# Patient Record
Sex: FEMALE | Employment: FULL TIME | URBAN - METROPOLITAN AREA
[De-identification: names, ages, dates, MRNs, and addresses within clinical notes are randomized per-mention and may not be internally consistent; named-entity substitution may affect disease eponyms.]

---

## 2021-05-24 ENCOUNTER — RECORDS - HEALTHEAST (OUTPATIENT)
Dept: ADMINISTRATIVE | Facility: CLINIC | Age: 71
End: 2021-05-24

## 2022-12-30 ENCOUNTER — APPOINTMENT (OUTPATIENT)
Dept: CT IMAGING | Facility: CLINIC | Age: 72
End: 2022-12-30
Attending: EMERGENCY MEDICINE
Payer: COMMERCIAL

## 2022-12-30 ENCOUNTER — HOSPITAL ENCOUNTER (EMERGENCY)
Facility: CLINIC | Age: 72
Discharge: HOME | End: 2022-12-30
Attending: EMERGENCY MEDICINE | Admitting: EMERGENCY MEDICINE
Payer: COMMERCIAL

## 2022-12-30 VITALS
OXYGEN SATURATION: 98 % | SYSTOLIC BLOOD PRESSURE: 176 MMHG | DIASTOLIC BLOOD PRESSURE: 92 MMHG | TEMPERATURE: 100 F | RESPIRATION RATE: 18 BRPM | WEIGHT: 180 LBS | HEART RATE: 70 BPM

## 2022-12-30 DIAGNOSIS — Z87.442 HISTORY OF KIDNEY STONES: ICD-10-CM

## 2022-12-30 DIAGNOSIS — K57.32 DIVERTICULITIS OF COLON: ICD-10-CM

## 2022-12-30 DIAGNOSIS — N28.89 RENAL MASS: ICD-10-CM

## 2022-12-30 DIAGNOSIS — R10.32 ABDOMINAL PAIN, LEFT LOWER QUADRANT: ICD-10-CM

## 2022-12-30 DIAGNOSIS — R11.0 NAUSEA WITHOUT VOMITING: ICD-10-CM

## 2022-12-30 LAB
ALBUMIN SERPL BCG-MCNC: 4.4 G/DL (ref 3.5–5.2)
ALBUMIN UR-MCNC: 30 MG/DL
ALP SERPL-CCNC: 79 U/L (ref 35–104)
ALT SERPL W P-5'-P-CCNC: 19 U/L (ref 10–35)
ANION GAP SERPL CALCULATED.3IONS-SCNC: 11 MMOL/L (ref 7–15)
APPEARANCE UR: CLEAR
AST SERPL W P-5'-P-CCNC: 22 U/L (ref 10–35)
BASOPHILS # BLD AUTO: 0 10E3/UL (ref 0–0.2)
BASOPHILS NFR BLD AUTO: 0 %
BILIRUB SERPL-MCNC: 0.4 MG/DL
BILIRUB UR QL STRIP: NEGATIVE
BUN SERPL-MCNC: 39.5 MG/DL (ref 8–23)
CALCIUM SERPL-MCNC: 11.4 MG/DL (ref 8.8–10.2)
CHLORIDE SERPL-SCNC: 102 MMOL/L (ref 98–107)
COLOR UR AUTO: ABNORMAL
CREAT SERPL-MCNC: 2.19 MG/DL (ref 0.51–0.95)
DEPRECATED HCO3 PLAS-SCNC: 24 MMOL/L (ref 22–29)
EOSINOPHIL # BLD AUTO: 0.1 10E3/UL (ref 0–0.7)
EOSINOPHIL NFR BLD AUTO: 1 %
ERYTHROCYTE [DISTWIDTH] IN BLOOD BY AUTOMATED COUNT: 12.6 % (ref 10–15)
GFR SERPL CREATININE-BSD FRML MDRD: 24 ML/MIN/1.73M2
GLUCOSE SERPL-MCNC: 109 MG/DL (ref 70–99)
GLUCOSE UR STRIP-MCNC: NEGATIVE MG/DL
HCT VFR BLD AUTO: 41.6 % (ref 35–47)
HGB BLD-MCNC: 13.4 G/DL (ref 11.7–15.7)
HGB UR QL STRIP: NEGATIVE
IMM GRANULOCYTES # BLD: 0 10E3/UL
IMM GRANULOCYTES NFR BLD: 0 %
KETONES UR STRIP-MCNC: NEGATIVE MG/DL
LEUKOCYTE ESTERASE UR QL STRIP: ABNORMAL
LYMPHOCYTES # BLD AUTO: 1.5 10E3/UL (ref 0.8–5.3)
LYMPHOCYTES NFR BLD AUTO: 16 %
MCH RBC QN AUTO: 30.7 PG (ref 26.5–33)
MCHC RBC AUTO-ENTMCNC: 32.2 G/DL (ref 31.5–36.5)
MCV RBC AUTO: 95 FL (ref 78–100)
MONOCYTES # BLD AUTO: 0.9 10E3/UL (ref 0–1.3)
MONOCYTES NFR BLD AUTO: 9 %
MUCOUS THREADS #/AREA URNS LPF: PRESENT /LPF
NEUTROPHILS # BLD AUTO: 7 10E3/UL (ref 1.6–8.3)
NEUTROPHILS NFR BLD AUTO: 74 %
NITRATE UR QL: NEGATIVE
NRBC # BLD AUTO: 0 10E3/UL
NRBC BLD AUTO-RTO: 0 /100
PH UR STRIP: 6 [PH] (ref 5–7)
PLATELET # BLD AUTO: 203 10E3/UL (ref 150–450)
POTASSIUM SERPL-SCNC: 4.5 MMOL/L (ref 3.4–5.3)
PROT SERPL-MCNC: 7.8 G/DL (ref 6.4–8.3)
RBC # BLD AUTO: 4.37 10E6/UL (ref 3.8–5.2)
RBC URINE: 0 /HPF
SODIUM SERPL-SCNC: 137 MMOL/L (ref 136–145)
SP GR UR STRIP: 1.02 (ref 1–1.03)
SQUAMOUS EPITHELIAL: 1 /HPF
UROBILINOGEN UR STRIP-MCNC: NORMAL MG/DL
WBC # BLD AUTO: 9.5 10E3/UL (ref 4–11)
WBC URINE: 2 /HPF

## 2022-12-30 PROCEDURE — 250N000009 HC RX 250: Performed by: EMERGENCY MEDICINE

## 2022-12-30 PROCEDURE — 99285 EMERGENCY DEPT VISIT HI MDM: CPT | Performed by: EMERGENCY MEDICINE

## 2022-12-30 PROCEDURE — 250N000013 HC RX MED GY IP 250 OP 250 PS 637: Performed by: EMERGENCY MEDICINE

## 2022-12-30 PROCEDURE — 80053 COMPREHEN METABOLIC PANEL: CPT | Performed by: EMERGENCY MEDICINE

## 2022-12-30 PROCEDURE — 74177 CT ABD & PELVIS W/CONTRAST: CPT

## 2022-12-30 PROCEDURE — 36415 COLL VENOUS BLD VENIPUNCTURE: CPT | Performed by: EMERGENCY MEDICINE

## 2022-12-30 PROCEDURE — 250N000011 HC RX IP 250 OP 636: Performed by: EMERGENCY MEDICINE

## 2022-12-30 PROCEDURE — 85025 COMPLETE CBC W/AUTO DIFF WBC: CPT | Performed by: EMERGENCY MEDICINE

## 2022-12-30 PROCEDURE — 99285 EMERGENCY DEPT VISIT HI MDM: CPT | Mod: 25 | Performed by: EMERGENCY MEDICINE

## 2022-12-30 PROCEDURE — 81001 URINALYSIS AUTO W/SCOPE: CPT | Performed by: EMERGENCY MEDICINE

## 2022-12-30 RX ORDER — IOPAMIDOL 755 MG/ML
81 INJECTION, SOLUTION INTRAVASCULAR ONCE
Status: COMPLETED | OUTPATIENT
Start: 2022-12-30 | End: 2022-12-30

## 2022-12-30 RX ORDER — OXYCODONE AND ACETAMINOPHEN 5; 325 MG/1; MG/1
1 TABLET ORAL ONCE
Status: COMPLETED | OUTPATIENT
Start: 2022-12-30 | End: 2022-12-30

## 2022-12-30 RX ORDER — HYDROMORPHONE HYDROCHLORIDE 1 MG/ML
0.5 INJECTION, SOLUTION INTRAMUSCULAR; INTRAVENOUS; SUBCUTANEOUS EVERY 30 MIN PRN
Status: DISCONTINUED | OUTPATIENT
Start: 2022-12-30 | End: 2022-12-30 | Stop reason: HOSPADM

## 2022-12-30 RX ORDER — OXYCODONE AND ACETAMINOPHEN 5; 325 MG/1; MG/1
1 TABLET ORAL EVERY 6 HOURS PRN
Qty: 12 TABLET | Refills: 0 | Status: SHIPPED | OUTPATIENT
Start: 2022-12-30 | End: 2023-01-02

## 2022-12-30 RX ORDER — OXYCODONE AND ACETAMINOPHEN 5; 325 MG/1; MG/1
1 TABLET ORAL EVERY 6 HOURS PRN
Qty: 12 TABLET | Refills: 0 | Status: SHIPPED | OUTPATIENT
Start: 2022-12-30 | End: 2022-12-30

## 2022-12-30 RX ORDER — ONDANSETRON 2 MG/ML
4 INJECTION INTRAMUSCULAR; INTRAVENOUS
Status: DISCONTINUED | OUTPATIENT
Start: 2022-12-30 | End: 2022-12-30 | Stop reason: HOSPADM

## 2022-12-30 RX ADMIN — AMOXICILLIN AND CLAVULANATE POTASSIUM 1 TABLET: 875; 125 TABLET, FILM COATED ORAL at 20:56

## 2022-12-30 RX ADMIN — SODIUM CHLORIDE 62 ML: 9 INJECTION, SOLUTION INTRAVENOUS at 18:58

## 2022-12-30 RX ADMIN — OXYCODONE HYDROCHLORIDE AND ACETAMINOPHEN 1 TABLET: 5; 325 TABLET ORAL at 20:56

## 2022-12-30 RX ADMIN — IOPAMIDOL 81 ML: 755 INJECTION, SOLUTION INTRAVENOUS at 18:56

## 2022-12-30 ASSESSMENT — ENCOUNTER SYMPTOMS
NAUSEA: 1
MUSCULOSKELETAL NEGATIVE: 1
RESPIRATORY NEGATIVE: 1
EYES NEGATIVE: 1
ALLERGIC/IMMUNOLOGIC NEGATIVE: 1
PSYCHIATRIC NEGATIVE: 1
NEUROLOGICAL NEGATIVE: 1
ABDOMINAL PAIN: 1
CONSTITUTIONAL NEGATIVE: 1
HEMATOLOGIC/LYMPHATIC NEGATIVE: 1
CARDIOVASCULAR NEGATIVE: 1
ENDOCRINE NEGATIVE: 1

## 2022-12-30 ASSESSMENT — ACTIVITIES OF DAILY LIVING (ADL)
ADLS_ACUITY_SCORE: 35
ADLS_ACUITY_SCORE: 35

## 2022-12-30 NOTE — ED PROVIDER NOTES
History     Chief Complaint   Patient presents with     Abdominal Pain     HPI  Falguni Bennett is a 72 year old female who who presents with lower abdominal pain since yesterday.      On examination patient arrived by car with her  for further evaluation with lower abdominal pain.  Patient reports a history of kidney stones improved after parathyroidectomy.  History of colonoscopy last in the spring 2022 showing polyps and diverticulosis.  She reports she struggles constipation but no prior diagnosis of IBS with constipation.  She reports that she last had a bowel movement about 3 days ago.  She reports no melena or hematochezia no fever or chills and no flank pain or back pain.  She reports nausea but no vomiting.  Due to persistent pain and abdominal pain with nausea she presents for further care.     Allergies:  Allergies   Allergen Reactions     Morphine [Morphine Sulfate-Nacl] Nausea and Vomiting     Cipro [Ciprofloxacin Hydrochloride]      Muscle weakness     Nuts      (Filberts) Throat swells       Problem List:    There are no problems to display for this patient.       Past Medical History:    No past medical history on file.    Past Surgical History:    Past Surgical History:   Procedure Laterality Date     EXPLORE NECK  2011    Procedure:EXPLORE NECK; Surgeon:LUIS ADAM; Location: OR     EXTRACORPOREAL SHOCK WAVE LITHOTRIPSY (ESWL)       PARATHYROIDECTOMY  2011    Procedure:PARATHYROIDECTOMY; Left inferior Parathyroidectomy; Surgeon:LUIS ADAM; Location: OR       Family History:    No family history on file.    Social History:  Marital Status:   [2]  Social History     Tobacco Use     Smoking status: Former     Packs/day: 0.50     Types: Cigarettes     Quit date: 2011     Years since quittin.7   Substance Use Topics     Alcohol use: No     Drug use: No        Medications:    senna-docusate (SENOKOT-S;PERICOLACE) 8.6-50 MG per  tablet          Review of Systems   Constitutional: Negative.    HENT: Negative.    Eyes: Negative.    Respiratory: Negative.    Cardiovascular: Negative.    Gastrointestinal: Positive for abdominal pain and nausea.   Endocrine: Negative.    Genitourinary: Negative.    Musculoskeletal: Negative.    Skin: Negative.    Allergic/Immunologic: Negative.    Neurological: Negative.    Hematological: Negative.    Psychiatric/Behavioral: Negative.    All other systems reviewed and are negative.      Physical Exam   BP: (!) 176/92  Pulse: 70  Temp: 100  F (37.8  C)  Resp: 18  Weight: 81.6 kg (180 lb)  SpO2: 98 %      Physical Exam  Constitutional:       General: She is not in acute distress.     Appearance: She is well-developed. She is not ill-appearing, toxic-appearing or diaphoretic.   HENT:      Head: Normocephalic and atraumatic.      Mouth/Throat:      Mouth: Mucous membranes are moist.   Eyes:      Extraocular Movements: Extraocular movements intact.      Pupils: Pupils are equal, round, and reactive to light.   Cardiovascular:      Rate and Rhythm: Normal rate and regular rhythm.   Pulmonary:      Effort: Pulmonary effort is normal. No respiratory distress.      Breath sounds: Normal breath sounds. No stridor. No wheezing, rhonchi or rales.   Chest:      Chest wall: No tenderness.   Abdominal:      General: Abdomen is flat. Bowel sounds are normal.      Palpations: Abdomen is soft.       Skin:     Capillary Refill: Capillary refill takes less than 2 seconds.      Coloration: Skin is not cyanotic, jaundiced, mottled or pale.      Findings: No erythema or rash.   Neurological:      General: No focal deficit present.      Mental Status: She is alert and oriented to person, place, and time.         ED Course                 Procedures              Critical Care time:  none                ED medications:  Medications   0.9% sodium chloride BOLUS (has no administration in time range)   ondansetron (ZOFRAN) injection 4 mg  (has no administration in time range)   HYDROmorphone (PF) (DILAUDID) injection 0.5 mg (has no administration in time range)   iopamidol (ISOVUE-370) solution 81 mL (81 mLs Intravenous Given 12/30/22 1856)   sodium chloride 0.9 % bag 500mL for CT scan flush use (62 mLs Intravenous Given 12/30/22 1858)       ED Vitals:  Vitals:    12/30/22 1514 12/30/22 1515   BP: (!) 176/92    Pulse: 70    Resp: 18    Temp: 100  F (37.8  C)    SpO2: 98%    Weight:  81.6 kg (180 lb)     ED labs and imaging:  Results for orders placed or performed during the hospital encounter of 12/30/22   Comprehensive metabolic panel     Status: Abnormal   Result Value Ref Range    Sodium 137 136 - 145 mmol/L    Potassium 4.5 3.4 - 5.3 mmol/L    Chloride 102 98 - 107 mmol/L    Carbon Dioxide (CO2) 24 22 - 29 mmol/L    Anion Gap 11 7 - 15 mmol/L    Urea Nitrogen 39.5 (H) 8.0 - 23.0 mg/dL    Creatinine 2.19 (H) 0.51 - 0.95 mg/dL    Calcium 11.4 (H) 8.8 - 10.2 mg/dL    Glucose 109 (H) 70 - 99 mg/dL    Alkaline Phosphatase 79 35 - 104 U/L    AST 22 10 - 35 U/L    ALT 19 10 - 35 U/L    Protein Total 7.8 6.4 - 8.3 g/dL    Albumin 4.4 3.5 - 5.2 g/dL    Bilirubin Total 0.4 <=1.2 mg/dL    GFR Estimate 24 (L) >60 mL/min/1.73m2   CBC with platelets and differential     Status: None   Result Value Ref Range    WBC Count 9.5 4.0 - 11.0 10e3/uL    RBC Count 4.37 3.80 - 5.20 10e6/uL    Hemoglobin 13.4 11.7 - 15.7 g/dL    Hematocrit 41.6 35.0 - 47.0 %    MCV 95 78 - 100 fL    MCH 30.7 26.5 - 33.0 pg    MCHC 32.2 31.5 - 36.5 g/dL    RDW 12.6 10.0 - 15.0 %    Platelet Count 203 150 - 450 10e3/uL    % Neutrophils 74 %    % Lymphocytes 16 %    % Monocytes 9 %    % Eosinophils 1 %    % Basophils 0 %    % Immature Granulocytes 0 %    NRBCs per 100 WBC 0 <1 /100    Absolute Neutrophils 7.0 1.6 - 8.3 10e3/uL    Absolute Lymphocytes 1.5 0.8 - 5.3 10e3/uL    Absolute Monocytes 0.9 0.0 - 1.3 10e3/uL    Absolute Eosinophils 0.1 0.0 - 0.7 10e3/uL    Absolute Basophils 0.0 0.0  - 0.2 10e3/uL    Absolute Immature Granulocytes 0.0 <=0.4 10e3/uL    Absolute NRBCs 0.0 10e3/uL   CBC with platelets differential     Status: None    Narrative    The following orders were created for panel order CBC with platelets differential.  Procedure                               Abnormality         Status                     ---------                               -----------         ------                     CBC with platelets and d...[[id number]]                      Final result                 Please view results for these tests on the individual orders.       Assessments & Plan (with Medical Decision Making)   Assessment Summary and Clinical Impression: 72-year-old female who presented with lower abdominal pain over the last 24 hours.  She arrived with a temp of 37.8.  Blood pressure was 176/92.  History of kidney stones.  No prior abdominal surgery.  History of colonoscopy with polyps and diverticulosis.  On exam she appeared in no acute distress.  She was nontoxic.  Protuberant abdomen which was soft with quiet bowel sounds throughout.  Pain was along the belt line.  With history of colonoscopy showing polyps and diveticulosis imaging obtained  to evaluate for intra-abdominal process and/or catastrophe. At shift end final disposition depend on ED course and imaging.    ED course and Plan:  Reviewed the medical record.  Last abdominal imaging was 10/7/2010.  Given her age and location of pain and discomfort work-up was undertaken to evaluate for acute diverticulitis or other intra-abdominal process and/or catastrophe.  Patient's work-up revealed normal white count, normal hemoglobin normal liver enzymes.  Glucose was 109.  She was offered therapies to manage her pain and nausea as reported    At shift end at 7.15pm patient signed out to Dr LUCRETIA Aldrich awaiting urinalysis and  CT abdomen pelvis with contrast.  Anticipate discharge if CT shows no acute or emergent intra-abdominal process or findings.  If  there are acute findings proceed as medically required.      Disclaimer: This note consists of symbols derived from keyboarding, dictation and/or voice recognition software. As a result, there may be errors in the script that have gone undetected. Please consider this when interpreting information found in this chart.  I have reviewed the nursing notes.    I have reviewed the findings, diagnosis, plan and need for follow up with the patient.             New Prescriptions    No medications on file       Final diagnoses:   Abdominal pain, left lower quadrant - across the belt line over the last 24 hours   Nausea without vomiting - over the last 24 hours   History of kidney stones       12/30/2022   [facility] EMERGENCY DEPT     Alfredo Wyatt MD  12/30/22 1923

## 2022-12-30 NOTE — ED TRIAGE NOTES
Low abd pain since yesterday     Triage Assessment     Row Name 12/30/22 0852       Triage Assessment (Adult)    Airway WDL WDL       Respiratory WDL    Respiratory WDL WDL       Skin Circulation/Temperature WDL    Skin Circulation/Temperature WDL WDL       Cardiac WDL    Cardiac WDL WDL       Peripheral/Neurovascular WDL    Peripheral Neurovascular WDL WDL       Cognitive/Neuro/Behavioral WDL    Cognitive/Neuro/Behavioral WDL WDL

## 2022-12-31 NOTE — ED PROVIDER NOTES
Emergency Department Patient Sign-out       Brief HPI:  This is a 72 year old female signed out to me by Dr. Wyatt.  See initial ED Provider note for details of the presentation.       Significant Events prior to my assuming care:    -suspicion for diverticulitis  -pending CT       Exam:   Patient Vitals for the past 24 hrs:   BP Temp Pulse Resp SpO2 Weight   12/30/22 1515 -- -- -- -- -- 81.6 kg (180 lb)   12/30/22 1514 (!) 176/92 100  F (37.8  C) 70 18 98 % --           ED RESULTS:   Results for orders placed or performed during the hospital encounter of 12/30/22 (from the past 24 hour(s))   CBC with platelets differential     Status: None    Collection Time: 12/30/22  5:54 PM    Narrative    The following orders were created for panel order CBC with platelets differential.  Procedure                               Abnormality         Status                     ---------                               -----------         ------                     CBC with platelets and d...[[id number]]                      Final result                 Please view results for these tests on the individual orders.   Comprehensive metabolic panel     Status: Abnormal    Collection Time: 12/30/22  5:54 PM   Result Value Ref Range    Sodium 137 136 - 145 mmol/L    Potassium 4.5 3.4 - 5.3 mmol/L    Chloride 102 98 - 107 mmol/L    Carbon Dioxide (CO2) 24 22 - 29 mmol/L    Anion Gap 11 7 - 15 mmol/L    Urea Nitrogen 39.5 (H) 8.0 - 23.0 mg/dL    Creatinine 2.19 (H) 0.51 - 0.95 mg/dL    Calcium 11.4 (H) 8.8 - 10.2 mg/dL    Glucose 109 (H) 70 - 99 mg/dL    Alkaline Phosphatase 79 35 - 104 U/L    AST 22 10 - 35 U/L    ALT 19 10 - 35 U/L    Protein Total 7.8 6.4 - 8.3 g/dL    Albumin 4.4 3.5 - 5.2 g/dL    Bilirubin Total 0.4 <=1.2 mg/dL    GFR Estimate 24 (L) >60 mL/min/1.73m2   CBC with platelets and differential     Status: None    Collection Time: 12/30/22  5:54 PM   Result Value Ref Range    WBC Count 9.5 4.0 - 11.0 10e3/uL    RBC  Count 4.37 3.80 - 5.20 10e6/uL    Hemoglobin 13.4 11.7 - 15.7 g/dL    Hematocrit 41.6 35.0 - 47.0 %    MCV 95 78 - 100 fL    MCH 30.7 26.5 - 33.0 pg    MCHC 32.2 31.5 - 36.5 g/dL    RDW 12.6 10.0 - 15.0 %    Platelet Count 203 150 - 450 10e3/uL    % Neutrophils 74 %    % Lymphocytes 16 %    % Monocytes 9 %    % Eosinophils 1 %    % Basophils 0 %    % Immature Granulocytes 0 %    NRBCs per 100 WBC 0 <1 /100    Absolute Neutrophils 7.0 1.6 - 8.3 10e3/uL    Absolute Lymphocytes 1.5 0.8 - 5.3 10e3/uL    Absolute Monocytes 0.9 0.0 - 1.3 10e3/uL    Absolute Eosinophils 0.1 0.0 - 0.7 10e3/uL    Absolute Basophils 0.0 0.0 - 0.2 10e3/uL    Absolute Immature Granulocytes 0.0 <=0.4 10e3/uL    Absolute NRBCs 0.0 10e3/uL       ED MEDICATIONS:   Medications   0.9% sodium chloride BOLUS (has no administration in time range)   iopamidol (ISOVUE-370) solution 81 mL (81 mLs Intravenous Given 12/30/22 1856)   sodium chloride 0.9 % bag 500mL for CT scan flush use (62 mLs Intravenous Given 12/30/22 1858)         Impression:  No diagnosis found.    Plan:    -Follow up CT  -reassess patient     851 - updated patient with results as below:                                                                   IMPRESSION:   1.  Acute, uncomplicated sigmoid diverticulitis.   2.  Incidental solid appearing 4.9 cm right lower pole renal mass, highly suspicious for neoplasm. There are some additional smaller indeterminate renal lesions. Recommend further characterization with contrast-enhanced MRI on a nonemergent basis and   referral to urology.    I gave her dose of Augmentin here and prescribed Augmentin to start tomorrow. Gave pain medication for home and cautioned about addiction, driving, and alcohol.     I informed the patient about the renal mass and emphasized the importance of follow up. I put it in her discharge paperwork. I also put an urgent referral to urology and ordered an outpatient renal MRI with and without contrast.     Patient  had all questions answered and felt safe with discharge.       MD Valdemar Rey, Peter Monteiro MD  12/30/22 1905       Peter Aldrich MD  12/30/22 2057

## 2022-12-31 NOTE — DISCHARGE INSTRUCTIONS
You have diverticulitis. We treated that with antibiotics. I gave you the first dose here. You can  your prescription tomorrow.    I prescribed some pain medication as well. It is an opiate and do not mix it with alcohol, driving, Tylenol, other sedating medications, drugs, or machinery operation.     You have a 4.9 cm right lower pole renal mass. This is concerning for a possible cancer. You need to see a urologist and get a contrast-enhanced MRI. You regular doctor can coordinate that.     It is very important to do this.

## 2023-01-09 ENCOUNTER — TELEPHONE (OUTPATIENT)
Dept: UROLOGY | Facility: CLINIC | Age: 73
End: 2023-01-09

## 2023-01-09 NOTE — TELEPHONE ENCOUNTER
Called and left VM for patient regarding upcoming VV with Dr. Mijares.  This author's direct line provided for future questions/concerns.    Minor Johnson, RN  RN Care Coordinator - Urology

## 2023-01-10 ENCOUNTER — TELEPHONE (OUTPATIENT)
Dept: UROLOGY | Facility: CLINIC | Age: 73
End: 2023-01-10

## 2023-01-10 NOTE — TELEPHONE ENCOUNTER
Called and left VM for patient again regarding upcoming appointment with Dr. Mijares.  This author's direct line provided for future questions/concerns.    Minor Johnson, RN  RN Care Coordinator - Urology

## 2023-01-10 NOTE — TELEPHONE ENCOUNTER
MEDICAL RECORDS REQUEST   [facility]  Urology Clinic  [address] [place] [address]  PHONE: [phone]  Fax: [phone]        FUTURE VISIT INFORMATION                                                   Falguni Bennett YOB: 1950 scheduled for future visit at [facility]    APPOINTMENT INFORMATION:    Date: 2023    Provider:  Urbano Mijares MD    Reason for Visit/Diagnosis: new renal mass    REFERRAL INFORMATION:    Referring provider:  Peter Aldrich MD      RECORDS REQUESTED FOR VISIT                                                     NOTES  STATUS/DETAILS   DISCHARGE REPORT from the ER  yes, 2022 -- [facility] ED   MEDICATION LIST  yes   LABS     URINALYSIS (UA)  yes   IMAGES  yes, 2023 -- MR RENAL  2022 -- CT ABD PELVIS     PRE-VISIT CHECKLIST      Record collection complete Yes   Appointment appropriately scheduled           (right time/right provider) Yes   Joint diagnostic appointment coordinated correctly          (ensure right order & amount of time) Yes   MyChart activation If no, please explain PENDING   Questionnaire complete If no, please explain PENDING

## 2023-01-13 ENCOUNTER — TELEPHONE (OUTPATIENT)
Dept: UROLOGY | Facility: CLINIC | Age: 73
End: 2023-01-13

## 2023-01-13 ENCOUNTER — HOSPITAL ENCOUNTER (OUTPATIENT)
Dept: MRI IMAGING | Facility: CLINIC | Age: 73
Discharge: HOME | End: 2023-01-13
Attending: EMERGENCY MEDICINE | Admitting: EMERGENCY MEDICINE
Payer: COMMERCIAL

## 2023-01-13 DIAGNOSIS — N28.89 RENAL MASS: ICD-10-CM

## 2023-01-13 PROCEDURE — 255N000002 HC RX 255 OP 636: Performed by: EMERGENCY MEDICINE

## 2023-01-13 PROCEDURE — 74183 MRI ABD W/O CNTR FLWD CNTR: CPT

## 2023-01-13 PROCEDURE — A9585 GADOBUTROL INJECTION: HCPCS | Performed by: EMERGENCY MEDICINE

## 2023-01-13 PROCEDURE — 258N000003 HC RX IP 258 OP 636: Performed by: EMERGENCY MEDICINE

## 2023-01-13 RX ORDER — GADOBUTROL 604.72 MG/ML
8 INJECTION INTRAVENOUS ONCE
Status: COMPLETED | OUTPATIENT
Start: 2023-01-13 | End: 2023-01-13

## 2023-01-13 RX ORDER — SODIUM CHLORIDE 9 MG/ML
60 INJECTION, SOLUTION INTRAVENOUS ONCE
Status: COMPLETED | OUTPATIENT
Start: 2023-01-13 | End: 2023-01-13

## 2023-01-13 RX ADMIN — GADOBUTROL 8 ML: 604.72 INJECTION INTRAVENOUS at 10:29

## 2023-01-13 RX ADMIN — SODIUM CHLORIDE 50 ML: 9 INJECTION, SOLUTION INTRAVENOUS at 10:29

## 2023-01-13 NOTE — TELEPHONE ENCOUNTER
Patient returned call to confirm VV with Dr. Mijares on Wed. 1/18/23.  This author's direct line provided for future questions/concerns.    Minor Johnson, RN  RN Care Coordinator - Urology

## 2023-01-17 ENCOUNTER — TELEPHONE (OUTPATIENT)
Dept: UROLOGY | Facility: CLINIC | Age: 73
End: 2023-01-17
Payer: COMMERCIAL

## 2023-01-17 NOTE — TELEPHONE ENCOUNTER
Called patient to reschedule VV with Dr. Mijares from 1/18 to 1/25 because MD is in surgery at the time of the meetings.  This author's direct line provided for future questions/concerns.    Minor Johnson, RN  RN Care Coordinator - Urology

## 2023-01-18 ENCOUNTER — PRE VISIT (OUTPATIENT)
Dept: UROLOGY | Facility: CLINIC | Age: 73
End: 2023-01-18

## 2023-01-25 ENCOUNTER — VIRTUAL VISIT (OUTPATIENT)
Dept: UROLOGY | Facility: CLINIC | Age: 73
End: 2023-01-25
Payer: COMMERCIAL

## 2023-01-25 ENCOUNTER — PREP FOR PROCEDURE (OUTPATIENT)
Dept: UROLOGY | Facility: CLINIC | Age: 73
End: 2023-01-25

## 2023-01-25 DIAGNOSIS — N28.89 RIGHT RENAL MASS: Primary | ICD-10-CM

## 2023-01-25 DIAGNOSIS — N28.89 RIGHT RENAL MASS: ICD-10-CM

## 2023-01-25 PROCEDURE — 99205 OFFICE O/P NEW HI 60 MIN: CPT | Mod: VID | Performed by: UROLOGY

## 2023-01-25 RX ORDER — CEFAZOLIN SODIUM 2 G/50ML
2 SOLUTION INTRAVENOUS
Status: CANCELLED | OUTPATIENT
Start: 2023-01-25

## 2023-01-25 RX ORDER — CEFAZOLIN SODIUM 2 G/50ML
2 SOLUTION INTRAVENOUS SEE ADMIN INSTRUCTIONS
Status: CANCELLED | OUTPATIENT
Start: 2023-01-25

## 2023-01-25 NOTE — LETTER
1/25/2023       RE: Falguni Bennett  [id number]  [facility] [address]     Dear Colleague,    Thank you for referring your patient, Falguni Bennett, to the [facility] [place] at [facility]. Please see a copy of my visit note below.      Urology Clinic  YU Mijares MD    Jan 25, 2023  72 year old female    ASSESSMENT AND PLAN    Kidney stones vs Nephrocalcinosis    4/18/11 Left parathyroidectomy.  Dr Smith.  Done for hyperparathyroidism.    12/30/22 CT shows multiple bilateral stones vs nephrocalcinosis.  These are more or less stable since 2010.    Right lower pole anterior renal mass.    12.30/22 CT shows 4.9cm mass    Renal cysts    1/13/23 MRI shows multiple bilateral renal cysts.  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  GFR in low 20s.      During counseling for this visit, we covered the risk of renal cell carcinoma and how this renal mass may be a non-cancerous lesion.  We covered options including observation, biopsy, cryoablation, partial nephrectomy, and nephrectomy.  We covered different surgical approaches such as percutaneous, laparoscopic, robotic, and open surgery.  We covered the risk of observation which is metastatic spread and need for continued observation.  We covered surgical risks which include but are not limited to heart attack, stroke, blood clot in the legs or lungs, death, injury to surrounding organs (intestine, liver, spleen, pancreas, lung, muscles, nerves), hernias, loss of sensation around incisions, decreased renal function, and infection.  We discussed how blood transfusion may be necessary and the risks are viral illnesses such as HIV(AIDS) and Hepatitis.  Additional procedures may be necessary in the perioperative period.  If minimally invasive techniques are used it may be necessary to convert to open surgery, and if partial nephrectomy is  attempted than full nephrectomy may be required.    We discussed how her renal failure is a significant complicating factor.  She is at risk of needing dialysis if she does have partial nephrectomy.  We will obviously use every technique to minimize or avoid this, but it is a risk that we cannot avoid given her low GFR.  Thus, we will plan for a biopsy first before any intervention.  I will also order a chest CT rule out any metastatic disease.        Plan    Chest CT    Biopsy of right renal mass in near future    Follow-up with me in 4 weeks.    Tentatively schedule right partial nephrectomy.  _______________________________________________________________________    HPI   She presented to the ER on 12/30/22 with lower abdominal pain.  On work-up for this a right renal mass was found.  This was a new finding for her.  She has a prominent history of kidney stones and has undergone parathyroidectomy in 2011.    The abdominal pain was found to be diverticulitis and this resolved with antibiotic.    Her grandmother and daughter had kidney stones but no other history of renal cell carcinoma.    She denies any flank pain since 2011    Presenting symptom: Incidental.  Denies hematuria, weight loss, or bone pain.  Hereditary syndromes: None    1/13/23 MRI Abdomen with and without contrast   I reviewed the radiologic images and report from this radiologic exam.  My independent interpretation is:    Right lower pole renal mass 4.7cm    Numerous bilateral cysts      12/30/22 CT Abdomen/Pelvis with and without contrast   I reviewed the radiologic images and report from this radiologic exam.  My independent interpretation is:    4.9cm right lower pole renal mass.          I reviewed the following laboratory data and went over findings with patient:  Recent Labs   Lab Test 12/30/22  1754   WBC 9.5   HGB 13.4        Recent Labs   Lab Test 12/30/22  1754   CR 2.19*   GFRESTIMATED 24*   *       Video-Visit  Details  Video Start Time: 217  Video End Time: 233  Time spent on pre-visit work, post visit work, and documentation on day of visit outside of time during video visit: 4 min  Total time on the day of the visit: 20 min  Originating Location (pt. Location): Home.    Distant Location (provider location):  [facility].  Platform used for Video Visit: Doxy          CC  Patient Care Team:  Aamir Cook MD as PCP - General      Copy to patient  MIKY DANIELS MAVIS  [id number]  [facility] [address]      Again, thank you for allowing me to participate in the care of your patient.      Sincerely,    Urbano Mijares MD

## 2023-01-25 NOTE — PROGRESS NOTES
Urology Clinic  YU Mijares MD    Jan 25, 2023  72 year old female    ASSESSMENT AND PLAN    Kidney stones vs Nephrocalcinosis    4/18/11 Left parathyroidectomy.  Dr Smith.  Done for hyperparathyroidism.    12/30/22 CT shows multiple bilateral stones vs nephrocalcinosis.  These are more or less stable since 2010.    Right lower pole anterior renal mass.    12.30/22 CT shows 4.9cm mass    Renal cysts    1/13/23 MRI shows multiple bilateral renal cysts.  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  GFR in low 20s.      During counseling for this visit, we covered the risk of renal cell carcinoma and how this renal mass may be a non-cancerous lesion.  We covered options including observation, biopsy, cryoablation, partial nephrectomy, and nephrectomy.  We covered different surgical approaches such as percutaneous, laparoscopic, robotic, and open surgery.  We covered the risk of observation which is metastatic spread and need for continued observation.  We covered surgical risks which include but are not limited to heart attack, stroke, blood clot in the legs or lungs, death, injury to surrounding organs (intestine, liver, spleen, pancreas, lung, muscles, nerves), hernias, loss of sensation around incisions, decreased renal function, and infection.  We discussed how blood transfusion may be necessary and the risks are viral illnesses such as HIV(AIDS) and Hepatitis.  Additional procedures may be necessary in the perioperative period.  If minimally invasive techniques are used it may be necessary to convert to open surgery, and if partial nephrectomy is attempted than full nephrectomy may be required.    We discussed how her renal failure is a significant complicating factor.  She is at risk of needing dialysis if she does have partial nephrectomy.  We will obviously use every technique to minimize or avoid this, but it is a risk that we cannot avoid given her  low GFR.  Thus, we will plan for a biopsy first before any intervention.  I will also order a chest CT rule out any metastatic disease.        Plan    Chest CT    Biopsy of right renal mass in near future    Follow-up with me in 4 weeks.    Tentatively schedule right partial nephrectomy.  _______________________________________________________________________    HPI   She presented to the ER on 12/30/22 with lower abdominal pain.  On work-up for this a right renal mass was found.  This was a new finding for her.  She has a prominent history of kidney stones and has undergone parathyroidectomy in 2011.    The abdominal pain was found to be diverticulitis and this resolved with antibiotic.    Her grandmother and daughter had kidney stones but no other history of renal cell carcinoma.    She denies any flank pain since 2011    Presenting symptom: Incidental.  Denies hematuria, weight loss, or bone pain.  Hereditary syndromes: None    1/13/23 MRI Abdomen with and without contrast   I reviewed the radiologic images and report from this radiologic exam.  My independent interpretation is:    Right lower pole renal mass 4.7cm    Numerous bilateral cysts      12/30/22 CT Abdomen/Pelvis with and without contrast   I reviewed the radiologic images and report from this radiologic exam.  My independent interpretation is:    4.9cm right lower pole renal mass.          I reviewed the following laboratory data and went over findings with patient:  Recent Labs   Lab Test 12/30/22  1754   WBC 9.5   HGB 13.4        Recent Labs   Lab Test 12/30/22  1754   CR 2.19*   GFRESTIMATED 24*   *       Video-Visit Details  Video Start Time: 217  Video End Time: 233  Time spent on pre-visit work, post visit work, and documentation on day of visit outside of time during video visit: 4 min  Total time on the day of the visit: 20 min  Originating Location (pt. Location): Home.    Distant Location (provider location):  [facility]  [place].  Platform used for Video Visit: Doxy          CC  Patient Care Team:  Aamir Cook MD as PCP - General      Copy to patient  MIKY DANIELS MAVIS  [id number]  [facility] [address]

## 2023-01-25 NOTE — NURSING NOTE
Provider joined early and I wasn't able to room the patient.    Natalya Hinton,   Virtual Visit Facilitator

## 2023-01-25 NOTE — CONSULTS
Outpatient IR Biopsy Referral    Patient is a 71 y/o female with a PMH of hyper parathyroidectomy s/p left parathyroidectomy 4/18/11, renal stones, abdominal pain with CT noting multiple bilateral stones vs nephrocalcinosis 12/30/22 and a right lower pole anterior mass 4.9 cm. IR has been asked to biopsy a right renal mass for concerns for RCC.            MRI  1/13/23 IMPRESSION:   1.  Prominent solid right lower renal mass showing heterogeneous  enhancement worrisome for renal neoplasm such as renal cell carcinoma.  Recommend further oncologic workup.  2.  Numerous additional bilateral simple and hemorrhagic renal cysts.  3.  Small hepatic nodules are technically nonspecific. This is not a  formal hepatic MRI. Consider performing 6 month surveillance liver MRI  for assessment.     KIDNEYS/BLADDER: Solid heterogeneous enhancing mass at the lower right  kidney measures 4.7 x 4 cm series 21 image 54. There are numerous  bilateral renal cysts also noted that otherwise do not show worrisome  features without specific imaging follow-up recommended. There are a  few increased T1 signal lesions without enhancement identified  bilaterally consistent with hemorrhagic cysts. No hydronephrosis. No  renal vein thrombosis can be seen.    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 There is a new solid-appearing, heterogeneous mass extending from the lower pole the right kidney measuring up to 4.9 cm (series 2 image 94). There are additional indeterminate bilateral renal lesions, largest in the right upper pole measuring up to 1.8 cm on (series 2 image 66). Additional simple appearing cysts. No hydronephrosis. Urinary bladder is unremarkable    IMPRESSION:   1. Acute, uncomplicated sigmoid diverticulitis.   2. Incidental solid appearing 4.9 cm right lower pole renal mass, highly suspicious for neoplasm. There  are some additional smaller indeterminate renal lesions. Recommend further characterization with contrast-enhanced MRI on a nonemergent basis and referral to urology.      Case and imaging [facility]I 1/13/23 and CT 12/30/22 was reviewed with Dr. Tatum from IR and CT guided biopsy of right lower pole renal mass is approved. MRI Series 21  Image 54, CT Series 2 Image 97    Procedure order, surgical pathology order placed.    If requesting team would like samples sent for anything else please enter them prior to scheduled procedure.    Primary team Dr. Mijares Urology made aware of IR recommendations via epic messaging.    NIKHIL Hedrick CNP  Interventional Radiology   IR on-call pager: [phone]

## 2023-01-25 NOTE — PROGRESS NOTES
"Falguni is a 72 year old who is being evaluated via a billable video visit.      How would you like to obtain your AVS? MyChart  If the video visit is dropped, the invitation should be resent by: Text to cell phone: [phone]  Will anyone else be joining your video visit? No  {If patient encounters technical issues they should call [phone] :[id number]}      Video-Visit Details    Type of service:  Video Visit   Video Start Time: {video visit start/end time for provider to select:284125}  Video End Time:{video visit start/end time for provider to select:407452}    Originating Location (pt. Location): {video visit patient location:524525::\"Home\"}  {PROVIDER LOCATION On-site should be selected for visits conducted from your clinic location or adjoining [facility] hospital, academic office, or other nearby [facility] building. Off-site should be selected for all other provider locations, including home:236514}  Distant Location (provider location):  {virtual location provider:056239}  Platform used for Video Visit: {Virtual Visit Platforms:454208::\"[employer]\"}  "

## 2023-02-03 ENCOUNTER — HOSPITAL ENCOUNTER (OUTPATIENT)
Dept: CT IMAGING | Facility: CLINIC | Age: 73
Discharge: HOME | End: 2023-02-03
Attending: UROLOGY | Admitting: UROLOGY
Payer: COMMERCIAL

## 2023-02-03 DIAGNOSIS — N28.89 RIGHT RENAL MASS: ICD-10-CM

## 2023-02-03 PROCEDURE — 71250 CT THORAX DX C-: CPT

## 2023-02-06 ENCOUNTER — MYC MEDICAL ADVICE (OUTPATIENT)
Dept: INTERVENTIONAL RADIOLOGY/VASCULAR | Facility: CLINIC | Age: 73
End: 2023-02-06
Payer: COMMERCIAL

## 2023-02-07 ENCOUNTER — TELEPHONE (OUTPATIENT)
Dept: UROLOGY | Facility: CLINIC | Age: 73
End: 2023-02-07
Payer: COMMERCIAL

## 2023-02-07 NOTE — TELEPHONE ENCOUNTER
Called to schedule surgery with Dr. Mijares, patient felt very overwhelmed and upset she hasn't had her biopsy yet and does not want to think about scheduling the Nephrectomy. Explained to patient Dr. Mijares does book out very far and he placed the order to have a tentative date for her, patient acknowledged and will to have her results before confirming surgery date on Monday 3/13/23.     Joshua Benjamin on 2/7/2023 at 12:10 PM

## 2023-02-07 NOTE — TELEPHONE ENCOUNTER
Writer has spoken with Falguni regarding planned procedure with IR via telephone.  Falguni acknowledges that she has received the [facility] instructions.     I have provided Falguni with IR number ([phone]) for questions or concerns.    Sandi JEAN  Interventional Radiology RN   [phone]

## 2023-02-11 ENCOUNTER — HEALTH MAINTENANCE LETTER (OUTPATIENT)
Age: 73
End: 2023-02-11

## 2023-02-15 ENCOUNTER — HOSPITAL ENCOUNTER (OUTPATIENT)
Facility: CLINIC | Age: 73
Discharge: HOME | End: 2023-02-15
Attending: UROLOGY | Admitting: RADIOLOGY
Payer: COMMERCIAL

## 2023-02-15 ENCOUNTER — APPOINTMENT (OUTPATIENT)
Dept: MEDSURG UNIT | Facility: CLINIC | Age: 73
End: 2023-02-15
Attending: UROLOGY
Payer: COMMERCIAL

## 2023-02-15 ENCOUNTER — APPOINTMENT (OUTPATIENT)
Dept: INTERVENTIONAL RADIOLOGY/VASCULAR | Facility: CLINIC | Age: 73
End: 2023-02-15
Attending: UROLOGY
Payer: COMMERCIAL

## 2023-02-15 VITALS
WEIGHT: 167.99 LBS | BODY MASS INDEX: 27.99 KG/M2 | SYSTOLIC BLOOD PRESSURE: 147 MMHG | DIASTOLIC BLOOD PRESSURE: 85 MMHG | RESPIRATION RATE: 16 BRPM | HEIGHT: 65 IN | TEMPERATURE: 97.7 F | HEART RATE: 83 BPM | OXYGEN SATURATION: 98 %

## 2023-02-15 DIAGNOSIS — N28.89 RIGHT RENAL MASS: ICD-10-CM

## 2023-02-15 LAB
ANION GAP SERPL CALCULATED.3IONS-SCNC: 11 MMOL/L (ref 7–15)
BUN SERPL-MCNC: 37 MG/DL (ref 8–23)
CALCIUM SERPL-MCNC: 11.3 MG/DL (ref 8.8–10.2)
CHLORIDE SERPL-SCNC: 108 MMOL/L (ref 98–107)
CREAT SERPL-MCNC: 2.15 MG/DL (ref 0.51–0.95)
DEPRECATED HCO3 PLAS-SCNC: 22 MMOL/L (ref 22–29)
ERYTHROCYTE [DISTWIDTH] IN BLOOD BY AUTOMATED COUNT: 12.9 % (ref 10–15)
GFR SERPL CREATININE-BSD FRML MDRD: 24 ML/MIN/1.73M2
GLUCOSE SERPL-MCNC: 110 MG/DL (ref 70–99)
HCT VFR BLD AUTO: 42.1 % (ref 35–47)
HGB BLD-MCNC: 13.4 G/DL (ref 11.7–15.7)
INR PPP: 0.93 (ref 0.85–1.15)
MCH RBC QN AUTO: 30.5 PG (ref 26.5–33)
MCHC RBC AUTO-ENTMCNC: 31.8 G/DL (ref 31.5–36.5)
MCV RBC AUTO: 96 FL (ref 78–100)
PLATELET # BLD AUTO: 230 10E3/UL (ref 150–450)
POTASSIUM SERPL-SCNC: 4.9 MMOL/L (ref 3.4–5.3)
RBC # BLD AUTO: 4.4 10E6/UL (ref 3.8–5.2)
SODIUM SERPL-SCNC: 141 MMOL/L (ref 136–145)
WBC # BLD AUTO: 5.4 10E3/UL (ref 4–11)

## 2023-02-15 PROCEDURE — 999N000133 HC STATISTIC PP CARE STAGE 2

## 2023-02-15 PROCEDURE — 85027 COMPLETE CBC AUTOMATED: CPT | Performed by: NURSE PRACTITIONER

## 2023-02-15 PROCEDURE — 272N000653 HC COIL/EMBOLIC DEVICE CR8

## 2023-02-15 PROCEDURE — 50200 RENAL BIOPSY PERQ: CPT | Mod: RT | Performed by: RADIOLOGY

## 2023-02-15 PROCEDURE — 88307 TISSUE EXAM BY PATHOLOGIST: CPT | Mod: TC | Performed by: UROLOGY

## 2023-02-15 PROCEDURE — 36415 COLL VENOUS BLD VENIPUNCTURE: CPT | Performed by: NURSE PRACTITIONER

## 2023-02-15 PROCEDURE — 77012 CT SCAN FOR NEEDLE BIOPSY: CPT

## 2023-02-15 PROCEDURE — 85610 PROTHROMBIN TIME: CPT | Performed by: NURSE PRACTITIONER

## 2023-02-15 PROCEDURE — 999N000142 HC STATISTIC PROCEDURE PREP ONLY

## 2023-02-15 PROCEDURE — 99152 MOD SED SAME PHYS/QHP 5/>YRS: CPT

## 2023-02-15 PROCEDURE — 99152 MOD SED SAME PHYS/QHP 5/>YRS: CPT | Mod: GC | Performed by: RADIOLOGY

## 2023-02-15 PROCEDURE — 88307 TISSUE EXAM BY PATHOLOGIST: CPT | Mod: 26 | Performed by: PATHOLOGY

## 2023-02-15 PROCEDURE — 250N000009 HC RX 250: Performed by: STUDENT IN AN ORGANIZED HEALTH CARE EDUCATION/TRAINING PROGRAM

## 2023-02-15 PROCEDURE — 272N000505 HC NEEDLE CR5

## 2023-02-15 PROCEDURE — 258N000003 HC RX IP 258 OP 636: Performed by: NURSE PRACTITIONER

## 2023-02-15 PROCEDURE — 77012 CT SCAN FOR NEEDLE BIOPSY: CPT | Mod: 26 | Performed by: RADIOLOGY

## 2023-02-15 PROCEDURE — 80048 BASIC METABOLIC PNL TOTAL CA: CPT | Performed by: NURSE PRACTITIONER

## 2023-02-15 PROCEDURE — 250N000011 HC RX IP 250 OP 636: Performed by: STUDENT IN AN ORGANIZED HEALTH CARE EDUCATION/TRAINING PROGRAM

## 2023-02-15 RX ORDER — NALOXONE HYDROCHLORIDE 0.4 MG/ML
0.2 INJECTION, SOLUTION INTRAMUSCULAR; INTRAVENOUS; SUBCUTANEOUS
Status: DISCONTINUED | OUTPATIENT
Start: 2023-02-15 | End: 2023-02-15 | Stop reason: HOSPADM

## 2023-02-15 RX ORDER — LORAZEPAM 0.5 MG/1
0.5 TABLET ORAL EVERY 6 HOURS PRN
COMMUNITY

## 2023-02-15 RX ORDER — ATORVASTATIN CALCIUM 20 MG/1
20 TABLET, FILM COATED ORAL AT BEDTIME
COMMUNITY

## 2023-02-15 RX ORDER — FLUMAZENIL 0.1 MG/ML
0.2 INJECTION, SOLUTION INTRAVENOUS
Status: DISCONTINUED | OUTPATIENT
Start: 2023-02-15 | End: 2023-02-15 | Stop reason: HOSPADM

## 2023-02-15 RX ORDER — SODIUM CHLORIDE 9 MG/ML
INJECTION, SOLUTION INTRAVENOUS CONTINUOUS
Status: DISCONTINUED | OUTPATIENT
Start: 2023-02-15 | End: 2023-02-15 | Stop reason: HOSPADM

## 2023-02-15 RX ORDER — LIDOCAINE 40 MG/G
CREAM TOPICAL
Status: DISCONTINUED | OUTPATIENT
Start: 2023-02-15 | End: 2023-02-15 | Stop reason: HOSPADM

## 2023-02-15 RX ORDER — NALOXONE HYDROCHLORIDE 0.4 MG/ML
0.4 INJECTION, SOLUTION INTRAMUSCULAR; INTRAVENOUS; SUBCUTANEOUS
Status: DISCONTINUED | OUTPATIENT
Start: 2023-02-15 | End: 2023-02-15 | Stop reason: HOSPADM

## 2023-02-15 RX ORDER — METOPROLOL SUCCINATE 50 MG/1
50 TABLET, EXTENDED RELEASE ORAL AT BEDTIME
COMMUNITY

## 2023-02-15 RX ORDER — FENTANYL CITRATE 50 UG/ML
25-50 INJECTION, SOLUTION INTRAMUSCULAR; INTRAVENOUS EVERY 5 MIN PRN
Status: DISCONTINUED | OUTPATIENT
Start: 2023-02-15 | End: 2023-02-15 | Stop reason: HOSPADM

## 2023-02-15 RX ADMIN — MIDAZOLAM 1 MG: 1 INJECTION INTRAMUSCULAR; INTRAVENOUS at 11:25

## 2023-02-15 RX ADMIN — FENTANYL CITRATE 50 MCG: 50 INJECTION, SOLUTION INTRAMUSCULAR; INTRAVENOUS at 11:26

## 2023-02-15 RX ADMIN — MIDAZOLAM 1 MG: 1 INJECTION INTRAMUSCULAR; INTRAVENOUS at 11:35

## 2023-02-15 RX ADMIN — SODIUM CHLORIDE: 9 INJECTION, SOLUTION INTRAVENOUS at 09:56

## 2023-02-15 RX ADMIN — LIDOCAINE HYDROCHLORIDE 5 ML: 10 INJECTION, SOLUTION EPIDURAL; INFILTRATION; INTRACAUDAL; PERINEURAL at 11:37

## 2023-02-15 RX ADMIN — FENTANYL CITRATE 50 MCG: 50 INJECTION, SOLUTION INTRAMUSCULAR; INTRAVENOUS at 11:36

## 2023-02-15 ASSESSMENT — ACTIVITIES OF DAILY LIVING (ADL)
ADLS_ACUITY_SCORE: 35
ADLS_ACUITY_SCORE: 35

## 2023-02-15 NOTE — PROGRESS NOTES
Patient Name: Falguni Bennett  Medical Record Number: [id number]  Today's Date: 2/15/2023    Procedure: C.T. Guided right Renal Biopsy.  Proceduralist: Dr. Tre Anderson, Dr. Sander Madrigal.  Pathology present: not requested by Dr. Anderson    Patient in room: 1105  Procedure Start: 1134  Procedure end: 1155  Sedation medications administered: 2 mg Versed, 100 mcg Fentanyl.  Patient out of room: 1206    Report given to: 2.AMercedes Chapman    Other Notes: Pt arrived to IR room C.T. 2 from 2.A. Consent reviewed. Pt denies any questions or concerns regarding procedure. Pt positioned supine and monitored per protocol. Pt tolerated procedure without any noted complications. Pt transferred back to 2.A.      5 specimen cores and put in formalin and sent to lab.

## 2023-02-15 NOTE — PROGRESS NOTES
discharge criteria met. R flank site intact. Voided clear urine. Reviewed discharge instructions with pt. Pt verbalized understanding of instructions and signed papers. PIV removed.  provided transportation home.

## 2023-02-15 NOTE — PROGRESS NOTES
S/p right renal mass biopsy. Right flank site intact, small drainage marked. VSS. Denies pain. Eating and drinking. Bedrest x 2

## 2023-02-15 NOTE — PROCEDURES
[facility]    Procedure: IR Renal Lesion Biopsy    Date/Time: 2/15/2023 12:04 PM  Performed by: Avery Meyers MD  Authorized by: Handy Anderson MD       UNIVERSAL PROTOCOL   Site Marked: Yes  Prior Images Obtained and Reviewed:  Yes  Required items: Required blood products, implants, devices and special equipment available    Patient identity confirmed:  Verbally with patient, arm band, provided demographic data and hospital-assigned identification number  Patient was reevaluated immediately before administering moderate or deep sedation or anesthesia  Confirmation Checklist:  Patient's identity using two indicators, relevant allergies, procedure was appropriate and matched the consent or emergent situation and correct equipment/implants were available  Time out: Immediately prior to the procedure a time out was called    Universal Protocol: the Joint Commission Universal Protocol was followed    Preparation: Patient was prepped and draped in usual sterile fashion    ESBL (mL):  1     ANESTHESIA    Anesthesia: Local infiltration  Local Anesthetic:  Lidocaine 1% without epinephrine  Anesthetic Total (mL):  10      SEDATION  Patient Sedated: Yes    Sedation:  Fentanyl and midazolam  Vital signs: Vital signs monitored during sedation    See dictated procedure note for full details.  Findings: Under CT guidance and comparing to prior CT and MRI, kidney lesion was identified. The site of the procedure was marked on the skin and the area was cleaned and draped under sterile technique. A 10 cm guide needle with sheath was advanced to the kidney lesion and the needle was removed, maintaining the sheath. Through the sheath a 15 cm 18 gauge coaxial Tenmo biopsy needle was introduced to the lesion. 5 passages were performed rendering 5 cores, which were deemed adequate on visualization. Pathology was not present for immediate analysis, and specimens were sent to  the pathology lab after the procedure. 3 gelfoam cores were introduced to the biopsy tract as the biopsy needle was removed and postprocedural CT imaging showed postprocedural changes without substantial bleeding.    Specimens: none    Complications: None    Condition: Stable      PROCEDURE    Patient Tolerance:  Patient tolerated the procedure well with no immediate complications  Length of time physician/provider present for 1:1 monitoring during sedation: 30

## 2023-02-15 NOTE — PROGRESS NOTES
Pt prepped for Right Renal Biopsy.  VSS.  Denies pain.  Right PIV placed and currently running NS at 75 ml/hr.  Labs pending.  Awaiting consent.  H&P needs updating, provider aware.   Bill at the bedside and will take pt home post procedure.

## 2023-02-15 NOTE — PRE-PROCEDURE
GENERAL PRE-PROCEDURE:   Procedure:  Right renal mass biopsy    Written consent obtained?: Yes    Risks and benefits: Risks, benefits and alternatives were discussed    Consent given by:  Patient  Patient states understanding of procedure being performed: Yes    Patient's understanding of procedure matches consent: Yes    Procedure consent matches procedure scheduled: Yes    Expected level of sedation:  Moderate  Appropriately NPO:  Yes  ASA Class:  3  Mallampati  :  Grade 2- soft palate, base of uvula, tonsillar pillars, and portion of posterior pharyngeal wall visible  Lungs:  Lungs clear with good breath sounds bilaterally  Heart:  Normal heart sounds and rate  History & Physical reviewed:  History and physical reviewed and no updates needed  Statement of review:  I have reviewed the lab findings, diagnostic data, medications, and the plan for sedation

## 2023-02-15 NOTE — DISCHARGE INSTRUCTIONS
[facility] Care Following Kidney (Lesion) Biopsy    Physician: Dr. Levi   Date: February 15, 2023    ACTIVITY:    Relax and take it easy, no strenuous activity for 24 hours.  No heavy lifting (>10 lbs.) for 1 week    DIET:            Resume your regular diet and drink plenty of fluids, unless you are fluid restricted                    DRAINAGE:    There should be minimal drainage from the biopsy site. If bleeding soaks the dressing, you should lie down and apply pressure to the site for a minimum of 10 minutes. If the bleeding persists, refer to the emergency contact numbers below; whether the bleeding persists or not, you should report the occurrence to one of the numbers below.    Refer to the emergency numbers below for the following:   Excessive bleeding or drainage  Excessive swelling, redness, or tenderness at the site  Fever above 100.5 degrees orally  Severe pain  Drainage that is green, yellow, thick white, or has a bad odor  Passage of bloody urine or clots after you are discharged.     Emergency Contact Numbers:             [phone]      Ask for the Adult Nephrology Fellow on Call, someone is available 24 hours a day.

## 2023-02-16 LAB
PATH REPORT.COMMENTS IMP SPEC: ABNORMAL
PATH REPORT.COMMENTS IMP SPEC: YES
PATH REPORT.FINAL DX SPEC: ABNORMAL
PATH REPORT.GROSS SPEC: ABNORMAL
PATH REPORT.MICROSCOPIC SPEC OTHER STN: ABNORMAL
PATH REPORT.RELEVANT HX SPEC: ABNORMAL
PHOTO IMAGE: ABNORMAL

## 2023-02-17 ENCOUNTER — TELEPHONE (OUTPATIENT)
Dept: UROLOGY | Facility: CLINIC | Age: 73
End: 2023-02-17
Payer: COMMERCIAL

## 2023-02-17 ENCOUNTER — TELEPHONE (OUTPATIENT)
Dept: INTERVENTIONAL RADIOLOGY/VASCULAR | Facility: CLINIC | Age: 73
End: 2023-02-17
Payer: COMMERCIAL

## 2023-02-17 DIAGNOSIS — N28.89 RIGHT RENAL MASS: Primary | ICD-10-CM

## 2023-02-17 NOTE — TELEPHONE ENCOUNTER
POST CALL    Spoke with: Falguni  Call attempt: 1  Message left: No  Any pain: No  Any fever: No  Any redness/swelling/ abnormal drainage around puncture site: No  Were you instructed well enough to take care of yourself at home: Yes  Are you satisfied with the care you received: Yes  Any additional concerns or questions: No  IR nurse triage line number provided: Yes    Post call completed.   February 17, 2023 9:33 AM  Sandi Latif RN

## 2023-02-17 NOTE — CONFIDENTIAL NOTE
Called patient to establish a follow-up VV with Dr. Mijares.  Patient understood instruction, date, and time.  This author's direct line provided for future questions/concerns.    Minor Johnson, RN  RN Care Coordinator - Urology

## 2023-02-24 ENCOUNTER — PRE VISIT (OUTPATIENT)
Dept: SURGERY | Facility: CLINIC | Age: 73
End: 2023-02-24

## 2023-02-24 ENCOUNTER — VIRTUAL VISIT (OUTPATIENT)
Dept: UROLOGY | Facility: CLINIC | Age: 73
End: 2023-02-24
Payer: COMMERCIAL

## 2023-02-24 DIAGNOSIS — C64.1 MALIGNANT NEOPLASM OF KIDNEY EXCLUDING RENAL PELVIS, RIGHT (H): Primary | ICD-10-CM

## 2023-02-24 PROCEDURE — 99213 OFFICE O/P EST LOW 20 MIN: CPT | Mod: VID | Performed by: UROLOGY

## 2023-02-24 NOTE — TELEPHONE ENCOUNTER
FUTURE VISIT INFORMATION      SURGERY INFORMATION:    Date: 3/13/23    Location: uu or    Surgeon:  Urbano Mijares MD    Anesthesia Type:  general    Procedure: NEPHRECTOMY, PARTIAL, ROBOT-ASSISTED, LAPAROSCOPIC - RIGHT, Ultrasound    Consult: virtual visit     RECORDS REQUESTED FROM:       Primary Care Provider: Aamir Cook MD - [facility]    Most recent EKG+ Tracin19- Jenelle

## 2023-02-24 NOTE — PROGRESS NOTES
Urology Clinic  YU Mijares MD    Feb 24, 2023  72 year old female    ASSESSMENT AND PLAN    Kidney stones vs Nephrocalcinosis    4/18/11 Left parathyroidectomy.  Dr Smith.  Done for hyperparathyroidism.    12/30/22 CT shows multiple bilateral stones vs nephrocalcinosis.  These are more or less stable since 2010.    Right lower pole anterior renal mass.    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  GFR in low 20s.      Plan    Right robot partial nephrectomy.  We discussed today how I will error on the side of maximizing kidney function.  I will resect any upper pole lesions that are obvious but only if they are clear cancers.  We discussed again how dialysis may be necessary.  I will not address any stones prior to surgery given her nephrocalcinosis.  _______________________________________________________________________    HPI   She presented to the ER on 12/30/22 with lower abdominal pain.  On work-up for this a right renal mass was found.  This was a new finding for her.  She has a prominent history of kidney stones and has undergone parathyroidectomy in 2011.    The abdominal pain was found to be diverticulitis and this resolved with antibiotic.    Her grandmother and daughter had kidney stones but no other history of renal cell carcinoma.    She denies any flank pain since 2011 1/13/23 MRI Abdomen with and without contrast   I reviewed the radiologic images and report from this radiologic exam.  My independent interpretation is:    Right lower pole renal mass 4.7cm    Numerous bilateral cysts      12/30/22 CT Abdomen/Pelvis with and without contrast   I reviewed the radiologic images and report from this radiologic exam.  My  independent interpretation is:    4.9cm right lower pole renal mass.          I reviewed the following laboratory data and went over findings with patient:  Recent Labs   Lab Test 12/30/22  1754   WBC 9.5   HGB 13.4        Recent Labs   Lab Test 12/30/22  1754   CR 2.19*   GFRESTIMATED 24*   *       Video-Visit Details  Video Start Time: 916  Video End Time: 927  Time spent on pre-visit work, post visit work, and documentation on day of visit outside of time during video visit: 2 min  Total time on the day of the visit: 13 min  Originating Location (pt. Location): Home.    Distant Location (provider location):  [facility].  Platform used for Video Visit: Karen JONES  Patient Care Team:  Aamir Cook MD as PCP - General      Copy to patient  MIKY DANIELS MAVIS  [id number]  [facility] [address]

## 2023-02-24 NOTE — TELEPHONE ENCOUNTER
Patient is scheduled for surgery with Dr. Mijares      Spoke with: Patient via phone      Date of Surgery: Monday March 13, 2023    Location: [facility]      Informed patient they will need an adult : Yes     Pre-op: Yes     PAC EVAL: Thursday March 02, 2023    Additional imaging/appointments:     Post-op: Wednesday March 29, 2023     Additional comments:      Surgery packet: Not sent     Patient is aware that surgery time is tentative to change and to expect a call 3-1 business days from Pre Admission Nursing for instructions and arrival time

## 2023-02-24 NOTE — LETTER
2/24/2023       RE: Falguni Bennett  [id number]  [facility] [address]     Dear Colleague,    Thank you for referring your patient, Falguni Bennett, to the [facility] [place] [facility]. Please see a copy of my visit note below.      Urology Clinic  YU Mijares MD    Feb 24, 2023  72 year old female    ASSESSMENT AND PLAN    Kidney stones vs Nephrocalcinosis    4/18/11 Left parathyroidectomy.  Dr Smith.  Done for hyperparathyroidism.    12/30/22 CT shows multiple bilateral stones vs nephrocalcinosis.  These are more or less stable since 2010.    Right lower pole anterior renal mass.    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  GFR in low 20s.      Plan    Right robot partial nephrectomy.  We discussed today how I will error on the side of maximizing kidney function.  I will resect any upper pole lesions that are obvious but only if they are clear cancers.  We discussed again how dialysis may be necessary.  I will not address any stones prior to surgery given her nephrocalcinosis.  _______________________________________________________________________    HPI   She presented to the ER on 12/30/22 with lower abdominal pain.  On work-up for this a right renal mass was found.  This was a new finding for her.  She has a prominent history of kidney stones and has undergone parathyroidectomy in 2011.    The abdominal pain was found to be diverticulitis and this resolved with antibiotic.    Her grandmother and daughter had kidney stones but no other history of renal cell carcinoma.    She denies any flank pain since 2011 1/13/23 MRI Abdomen with and without contrast    I reviewed the radiologic images and report from this radiologic exam.  My independent interpretation is:    Right lower pole renal mass 4.7cm    Numerous bilateral cysts      12/30/22 CT Abdomen/Pelvis with and without contrast   I reviewed the radiologic images and report from this radiologic exam.  My independent interpretation is:    4.9cm right lower pole renal mass.          I reviewed the following laboratory data and went over findings with patient:  Recent Labs   Lab Test 12/30/22  1754   WBC 9.5   HGB 13.4        Recent Labs   Lab Test 12/30/22  1754   CR 2.19*   GFRESTIMATED 24*   *       Video-Visit Details  Video Start Time: 916  Video End Time: 927  Time spent on pre-visit work, post visit work, and documentation on day of visit outside of time during video visit: 2 min  Total time on the day of the visit: 13 min  Originating Location (pt. Location): Home.    Distant Location (provider location):  [facility].  Platform used for Video Visit: [employer]                Patient Care Team:  Aamir Cook MD as PCP - General      Copy to patient  MIKY DANIELS MAVIS  [id number]  [facility] [address]    Sincerely,    Urbano Mijares MD

## 2023-02-24 NOTE — NURSING NOTE
Is the patient currently in the state of [place]? YES    Visit mode:VIDEO    If the visit is dropped, the patient can be reconnected by: VIDEO VISIT: Text to cell phone: [phone]    Will anyone else be joining the visit? NO      How would you like to obtain your AVS? MyChart    Are changes needed to the allergy or medication list? NO    Reason for visit:   Chief Complaint   Patient presents with     Video Visit     Biopsy results, return kidney mass

## 2023-02-24 NOTE — PROGRESS NOTES
"Video-Visit Details    Type of service:  Video Visit    Video Start Time (time video started): ***    Video End Time (time video stopped): ***    Originating Location (pt. Location): {video visit patient location:468063::\"Home\"}    {PROVIDER LOCATION On-site should be selected for visits conducted from your clinic location or adjoining [facility] hospital, academic office, or other nearby [facility] building. Off-site should be selected for all other provider locations, including home:321172}    Distant Location (provider location):  {virtual location provider:676236}    Mode of Communication:  Video Conference via [employer]        "

## 2023-03-02 ENCOUNTER — TELEPHONE (OUTPATIENT)
Dept: UROLOGY | Facility: CLINIC | Age: 73
End: 2023-03-02

## 2023-03-02 NOTE — CONFIDENTIAL NOTE
Pre Op Teaching Flowsheet       Pre and Post op Patient Education  Relevant Diagnosis:  Renal cell carcinoma  Surgical procedure:  Partial nephrectomy  Teaching Topic:  Pre and post op teaching  Person Involved in teaching: Yes    Motivation Level:  Asks Questions: Yes  Eager to Learn: Yes  Cooperative: Yes  Receptive (willing/able to accept information):  Yes    Patient demonstrates understanding of the following:  Date of surgery:  3/13/23  Location of surgery:  [address] [place] [address]  History and Physical and any other testing necessary prior to surgery: Yes  Required time line for completion of History and Physical and any pre-op testing: Yes    Patient demonstrates understanding of the following:  Pre-op bowel prep:  N/A  Pre-op showering/scrub information with PCMX Soap: Yes  Blood thinner medications discussed and when to stop (if applicable):  N/A  Discussed no visitor's at this time due to increase Covid-19 cases and how we need to make sure everyone stays safe.    Infection Prevention:   Patient demonstrates understanding of the following:  Surgical procedure site care taught: Yes  Signs and symptoms of infection taught: Yes      Post-op follow-up:  Discussed how to contact the hospital, nurse, and clinic scheduling staff if necessary. (See packet information)    Instructional materials used/given/mailed:  [facility] Packet, post op teaching sheet, Map, Soap, and with the arrival/location information to come closer to the surgery date.    Surgical instructions packet given to patient in office:  N/A    Follow up: Discussed arranging for someone to drive you home. ( No public transportation)  Someone needed to stay the first twenty hours after surgery: Yes     referral: no     home:  yes    Care Giver:  yes    PCP:  Yes    Patient will have H&P on Thursday, the 9th of March and will have UA early next week    Minor Johnson, RN  RN Care Coordinator - Urology

## 2023-03-03 ENCOUNTER — LAB (OUTPATIENT)
Dept: LAB | Facility: CLINIC | Age: 73
End: 2023-03-03
Payer: COMMERCIAL

## 2023-03-03 DIAGNOSIS — N28.89 RIGHT RENAL MASS: ICD-10-CM

## 2023-03-03 LAB
ALBUMIN UR-MCNC: 30 MG/DL
APPEARANCE UR: CLEAR
BILIRUB UR QL STRIP: NEGATIVE
COLOR UR AUTO: YELLOW
GLUCOSE UR STRIP-MCNC: NEGATIVE MG/DL
HGB UR QL STRIP: NEGATIVE
KETONES UR STRIP-MCNC: NEGATIVE MG/DL
LEUKOCYTE ESTERASE UR QL STRIP: NEGATIVE
NITRATE UR QL: NEGATIVE
PH UR STRIP: 6 [PH] (ref 5–7)
RBC #/AREA URNS AUTO: ABNORMAL /HPF
SP GR UR STRIP: 1.01 (ref 1–1.03)
SQUAMOUS #/AREA URNS AUTO: ABNORMAL /LPF
UROBILINOGEN UR STRIP-ACNC: 0.2 E.U./DL
WBC #/AREA URNS AUTO: ABNORMAL /HPF

## 2023-03-03 PROCEDURE — 81001 URINALYSIS AUTO W/SCOPE: CPT

## 2023-03-09 ENCOUNTER — ANESTHESIA EVENT (OUTPATIENT)
Dept: SURGERY | Facility: CLINIC | Age: 73
DRG: 657 | End: 2023-03-09
Payer: COMMERCIAL

## 2023-03-09 ENCOUNTER — VIRTUAL VISIT (OUTPATIENT)
Dept: SURGERY | Facility: CLINIC | Age: 73
End: 2023-03-09
Payer: COMMERCIAL

## 2023-03-09 ENCOUNTER — TELEPHONE (OUTPATIENT)
Dept: SURGERY | Facility: CLINIC | Age: 73
End: 2023-03-09

## 2023-03-09 ENCOUNTER — PRE VISIT (OUTPATIENT)
Dept: SURGERY | Facility: CLINIC | Age: 73
End: 2023-03-09

## 2023-03-09 DIAGNOSIS — N28.89 RIGHT RENAL MASS: ICD-10-CM

## 2023-03-09 DIAGNOSIS — Z01.818 PREOP EXAMINATION: Primary | ICD-10-CM

## 2023-03-09 LAB
ABO/RH(D): NORMAL
ANTIBODY SCREEN: NEGATIVE
SPECIMEN EXPIRATION DATE: NORMAL

## 2023-03-09 PROCEDURE — 99204 OFFICE O/P NEW MOD 45 MIN: CPT | Mod: VID | Performed by: CLINICAL NURSE SPECIALIST

## 2023-03-09 RX ORDER — LIDOCAINE 40 MG/G
CREAM TOPICAL
Status: CANCELLED | OUTPATIENT
Start: 2023-03-09

## 2023-03-09 RX ORDER — ASPIRIN 81 MG/1
81 TABLET, CHEWABLE ORAL PRN
Status: ON HOLD | COMMUNITY
End: 2023-03-14

## 2023-03-09 ASSESSMENT — ENCOUNTER SYMPTOMS
DYSRHYTHMIAS: 0
SEIZURES: 0

## 2023-03-09 ASSESSMENT — PAIN SCALES - GENERAL: PAINLEVEL: NO PAIN (0)

## 2023-03-09 ASSESSMENT — LIFESTYLE VARIABLES: TOBACCO_USE: 1

## 2023-03-09 NOTE — H&P (VIEW-ONLY)
Pre-Operative H & P     CC:  Preoperative exam to assess for increased cardiopulmonary risk while undergoing surgery and anesthesia.    Date of Encounter: 3/9/2023  Primary Care Physician:  Aamir Cook     Reason for visit:   Encounter Diagnoses   Name Primary?     Preop examination Yes     Right renal mass        HPI  Falguni Bennett is a 72 year old female who presents for pre-operative H & P in preparation for  Procedure Information     Case: [id number] Date/Time: 03/13/23 0730    Procedure: NEPHRECTOMY, PARTIAL, ROBOT-ASSISTED, LAPAROSCOPIC - RIGHT, Ultrasound (Right: Abdomen)    Anesthesia type: General    Diagnosis: Right renal mass [N28.89]    Pre-op diagnosis: Right renal mass [N28.89]    [facility]    Providers: Urbano Mijares MD        History is obtained from the patient and chart review    Patient who is followed by Dr. Mijares for kidney stones versus nephrocalcinosis. Last CT showed multiple, bilateral stones stable since 2010. This CT scan also showed a 4.9 cm right lower pole anterior mass and possible right upper pole mass. 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     The patient also has renal cysts. An MRI on 1/13/23 showed multiple bilateral renal cysts, some with hemorrhage. Her creatinine range has been 2.2 - 2.5. GFR in low 20s. Patient's treatment options and concern for kidney function were discussed by Dr. Mijares. She has been counseled for above procedures.    In 2011 she underwent left parathyroidectomy for hyperparathyroidism. Patient's history is otherwise significant for HLD, HYPERTENSION, and anxiety.     Hx of abnormal bleeding or anti-platelet use: Has prn ASA but will hold for surgery    Menstrual history: No LMP recorded (lmp unknown). Patient is postmenopausal.    Past Medical History  Past Medical History:   Diagnosis Date     Anxiety      Chronic kidney disease      Dyslipidemia      HTN  (hypertension)      Hyperparathyroidism (H)      Nephrolithiasis      Renal mass        Past Surgical History  Past Surgical History:   Procedure Laterality Date     ANKLE SURGERY Right 2019    schwannoma removed from ankle     EXPLORE NECK  2011    Procedure:EXPLORE NECK; Surgeon:LUIS ADAM; Location:[facility]     EXTRACORPOREAL SHOCK WAVE LITHOTRIPSY (ESWL)       PARATHYROIDECTOMY  2011    Procedure:PARATHYROIDECTOMY; Left inferior Parathyroidectomy; Surgeon:LUIS ADAM; Location:[facility]     TUBAL LIGATION         Prior to Admission Medications  Current Outpatient Medications   Medication Sig Dispense Refill     aspirin (ASA) 81 MG chewable tablet Take 81 mg by mouth as needed for moderate pain (4-6)       atorvastatin (LIPITOR) 20 MG tablet Take 20 mg by mouth At Bedtime       LORazepam (ATIVAN) 0.5 MG tablet Take 0.5 mg by mouth every 6 hours as needed for anxiety       metoprolol succinate ER (TOPROL XL) 50 MG 24 hr tablet Take 50 mg by mouth At Bedtime         Allergies  Allergies   Allergen Reactions     Morphine [Morphine Sulfate-Nacl] Nausea and Vomiting     Cipro [Ciprofloxacin Hydrochloride]      Muscle weakness     Nuts      (Filberts) Throat swells       Social History  Social History     Socioeconomic History     Marital status:      Spouse name: Not on file     Number of children: Not on file     Years of education: Not on file     Highest education level: Not on file   Occupational History     Not on file   Tobacco Use     Smoking status: Former     Packs/day: 0.50     Types: Cigarettes     Quit date: 2011     Years since quittin.9     Smokeless tobacco: Not on file   Substance and Sexual Activity     Alcohol use: Yes     Comment: Rare     Drug use: Yes     Types: Marijuana     Comment: Once in a while     Sexual activity: Not on file   Other Topics Concern     Parent/sibling w/ CABG, MI or angioplasty before 65F 55M? Not Asked   Social History Narrative      Not on file     Social Determinants of Health     Financial Resource Strain: Not on file   Food Insecurity: Not on file   Transportation Needs: Not on file   Physical Activity: Not on file   Stress: Not on file   Social Connections: Not on file   Intimate Partner Violence: Not on file   Housing Stability: Not on file       Family History  Family History   Problem Relation Age of Onset     Ovarian Cancer Mother      Hypertension Mother      Lung Cancer Brother      Arthritis Maternal Grandmother      Cerebrovascular Disease Maternal Grandmother      Diabetes Paternal Grandmother      Cancer Maternal Aunt      Anesthesia Reaction No family hx of      Clotting Disorder No family hx of        Review of Systems  The complete review of systems is negative other than noted in the HPI or here.   Anesthesia Evaluation   Pt has had prior anesthetic. Type: General.    No history of anesthetic complications       ROS/MED HX  ENT/Pulmonary:     (+) JAISON risk factors, hypertension, tobacco use, Past use,  (-) recent URI   Neurologic:    (-) no seizures and no CVA   Cardiovascular: Comment: Has prn ASA but will hold for surgery    (+) Dyslipidemia hypertension-----Previous cardiac testing   Echo: Date: Results:    Stress Test: Date: Results:    ECG Reviewed: Date: 2019 Results:  NSR  Cath: Date: Results:   (-) taking anticoagulants/antiplatelets and arrhythmias   METS/Exercise Tolerance: >4 METS Comment: Able to walk 2 blocks and climb a flight of stairs without exertional symptoms. Reports if ran up the stairs she would probably have some shortness of breath.    Currently limited by sciatica. Used to be able to walk miles   Hematologic: Comments: Thinks she had a blood transfusion as baby due to Rh factor    (+) history of blood transfusion, no previous transfusion reaction,  (-) history of blood clots   Musculoskeletal:  - neg musculoskeletal ROS     GI/Hepatic:    (-) GERD   Renal/Genitourinary:     (+) renal disease, type: CRI,  Pt does not require dialysis,     Endo: Comment: Hyperparathyroidism, s/p parathyroidectomy in 2011. Last calcium 11.3      Psychiatric/Substance Use:     (+) psychiatric history anxiety Recreational drug usage: Cannabis (occ).    Infectious Disease:  - neg infectious disease ROS     Malignancy: Comment: Right renal mass  (+) Malignancy, History of Other.Other CA papillary RCC Active status post.    Other:  - neg other ROS          Virtual visit -  No vitals were obtained    Physical Exam  Constitutional: Awake, alert, no apparent distress, and appears stated age.  HENT: Normocephalic  Respiratory: non labored breathing; no cough   Neurologic: Oriented to name, place and time.   Neuropsychiatric: Calm, cooperative. Normal affect.      Prior Labs/Diagnostic Studies   All labs and imaging personally reviewed   Lab Results   Component Value Date    WBC 5.4 02/15/2023     Lab Results   Component Value Date    RBC 4.40 02/15/2023     Lab Results   Component Value Date    HGB 13.4 02/15/2023     Lab Results   Component Value Date    HCT 42.1 02/15/2023     Lab Results   Component Value Date    MCV 96 02/15/2023     Lab Results   Component Value Date    MCH 30.5 02/15/2023     Lab Results   Component Value Date    MCHC 31.8 02/15/2023     Lab Results   Component Value Date    RDW 12.9 02/15/2023     Lab Results   Component Value Date     02/15/2023     Last Comprehensive Metabolic Panel:  Sodium   Date Value Ref Range Status   02/15/2023 141 136 - 145 mmol/L Final     Potassium   Date Value Ref Range Status   02/15/2023 4.9 3.4 - 5.3 mmol/L Final     Chloride   Date Value Ref Range Status   02/15/2023 108 (H) 98 - 107 mmol/L Final     Carbon Dioxide (CO2)   Date Value Ref Range Status   02/15/2023 22 22 - 29 mmol/L Final     Anion Gap   Date Value Ref Range Status   02/15/2023 11 7 - 15 mmol/L Final     Glucose   Date Value Ref Range Status   02/15/2023 110 (H) 70 - 99 mg/dL Final     Urea Nitrogen   Date Value Ref  Range Status   02/15/2023 37.0 (H) 8.0 - 23.0 mg/dL Final     Creatinine   Date Value Ref Range Status   02/15/2023 2.15 (H) 0.51 - 0.95 mg/dL Final     GFR Estimate   Date Value Ref Range Status   02/15/2023 24 (L) >60 mL/min/1.73m2 Final     Comment:     eGFR calculated using  CKD-EPI equation.     Calcium   Date Value Ref Range Status   02/15/2023 11.3 (H) 8.8 - 10.2 mg/dL Final   2011 10.8 (H) 8.5 - 10.4 mg/dL Final     Bilirubin Total   Date Value Ref Range Status   2022 0.4 <=1.2 mg/dL Final     Alkaline Phosphatase   Date Value Ref Range Status   2022 79 35 - 104 U/L Final     ALT   Date Value Ref Range Status   2022 19 10 - 35 U/L Final     AST   Date Value Ref Range Status   2022 22 10 - 35 U/L Final     2/15/23 INR 0.93    EK Normal sinus rhythm    2/3/23 CT chest  Minimal coronary artery  atherosclerosis is present.   IMPRESSION:   1.  No findings specific for metastatic disease in the chest.  2.  A few tiny 2 to 3 mm pulmonary nodules of doubtful significance.    MR Renal 23                                                                   IMPRESSION:   1.  Prominent solid right lower renal mass showing heterogeneous  enhancement worrisome for renal neoplasm such as renal cell carcinoma.  Recommend further oncologic workup.  2.  Numerous additional bilateral simple and hemorrhagic renal cysts.  3.  Small hepatic nodules are technically nonspecific. This is not a  formal hepatic MRI. Consider performing 6 month surveillance liver MRI  for assessment.     CT abd/pelvis 22                                             IMPRESSION:   1.  Acute, uncomplicated sigmoid diverticulitis.   2.  Incidental solid appearing 4.9 cm right lower pole renal mass, highly suspicious for neoplasm. There are some additional smaller indeterminate renal lesions. Recommend further characterization with contrast-enhanced MRI on a nonemergent basis and   referral to urology.    The  "patient's records and results personally reviewed by this provider.     Outside records reviewed from: Care Everywhere      Assessment      Falguni Bennett is a 72 year old female seen as a PAC referral for risk assessment and optimization for anesthesia.    Plan/Recommendations  Pt will be optimized for the proposed procedure.  See below for details on the assessment, risk, and preoperative recommendations    NEUROLOGY  - No history of TIA, CVA or seizure    -Post Op delirium risk factors:  High co-morbid index    ENT  - No current airway concerns.  Will need to be reassessed day of surgery.  Mallampati: Unable to assess  TM: Unable to assess   Upper partial    CARDIAC  HLD. Atorvastatin at HS. HYPERTENSION reported in good control. Metoprolol at HS. No other cardiac history, symptoms or meds. CT chest showed minimal coronary calcifications. Able to walk at least 2 blocks and walk up a flight of stairs without exertional symptoms. Had been walking miles but is now bothered by sciatica.   - METS (Metabolic Equivalents)>4    RCRI: 6.6% risk of serious cardiac events    PULMONARY  Denies asthma, cough or shortness of breath  Low risk for JAISON  - Tobacco History      History   Smoking Status     Former     Packs/day: 0.50     Types: Cigarettes     Quit date: 4/4/2011   Smokeless Tobacco     Not on file       GI: Denies GERD  Diverticulitis  PONV Medium Risk  Total Score: 2           1 AN PONV: Pt is Female    1 AN PONV: Patient is not a current smoker        /RENAL  - Baseline Creatinine  Last 2.15  - CKD    ENDOCRINE  - BMI: Estimated body mass index is 28.39 kg/m  as calculated from the following:    Height as of 2/15/23: 1.638 m (5' 4.5\").    Weight as of 2/15/23: 76.2 kg (167 lb 15.9 oz).  Overweight (BMI 25.0-29.9)  - No history of Diabetes Mellitus Recent random blood glucose readings 109-110.  - Hyperparathyroidism s/p left inferior parathyroidectomy in 2011. Last calcium 11.3    HEME  VTE Low Risk 0.26%        "     Total Score: 0      Denies personal or family history of blood clots  Believes she has had a blood transfusion as a baby for Rh factor. Will have patient obtain updated type and screen prior to surgery.     MSK: Patient is not frail  Sciatica symptoms, as above.     PSYCH  - Anxiety with prn lorazepam     Final plan will be decided by the assigned anesthesia provider on the date of service.    Patient was discussed with Dr. Smith Will proceed    The patient is optimized for their procedure. AVS with information on surgery time/arrival time, meds and NPO status given by nursing staff. No further diagnostic testing indicated.    Please refer to the physical examination documented by the anesthesiologist in the anesthesia record on the day of surgery.    Video-Visit Details    Type of service:  Video Visit    Provider received verbal consent for a Video Visit from the patient? Yes   Video Start Time: 9:58am   Video End Time: 10:06am     Originating Location (pt. Location): Home    Distant Location (provider location):  Off-site  Mode of Communication:  Video Conference via [employer]  On the day of service:     Prep time: 16 minutes  Visit time: 8 minutes  Documentation time: 23 minutes  ------------------------------------------  Total time: 47 minutes      NIKHIL Whitaker CNS  Preoperative Assessment Center  [facility]  Phone: [phone]  Fax: [phone]

## 2023-03-09 NOTE — H&P
Pre-Operative H & P     CC:  Preoperative exam to assess for increased cardiopulmonary risk while undergoing surgery and anesthesia.    Date of Encounter: 3/9/2023  Primary Care Physician:  Aamir Cook     Reason for visit:   Encounter Diagnoses   Name Primary?     Preop examination Yes     Right renal mass        HPI  Falguni Bennett is a 72 year old female who presents for pre-operative H & P in preparation for  Procedure Information     Case: [id number] Date/Time: 03/13/23 0730    Procedure: NEPHRECTOMY, PARTIAL, ROBOT-ASSISTED, LAPAROSCOPIC - RIGHT, Ultrasound (Right: Abdomen)    Anesthesia type: General    Diagnosis: Right renal mass [N28.89]    Pre-op diagnosis: Right renal mass [N28.89]    [facility]  [facility]    Providers: Urbano Mijares MD        History is obtained from the patient and chart review    Patient who is followed by Dr. Mijares for kidney stones versus nephrocalcinosis. Last CT showed multiple, bilateral stones stable since 2010. This CT scan also showed a 4.9 cm right lower pole anterior mass and possible right upper pole mass. 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     The patient also has renal cysts. An MRI on 1/13/23 showed multiple bilateral renal cysts, some with hemorrhage. Her creatinine range has been 2.2 - 2.5. GFR in low 20s. Patient's treatment options and concern for kidney function were discussed by Dr. Mijares. She has been counseled for above procedures.    In 2011 she underwent left parathyroidectomy for hyperparathyroidism. Patient's history is otherwise significant for HLD, HYPERTENSION, and anxiety.     Hx of abnormal bleeding or anti-platelet use: Has prn ASA but will hold for surgery    Menstrual history: No LMP recorded (lmp unknown). Patient is postmenopausal.    Past Medical History  Past Medical History:   Diagnosis Date     Anxiety      Chronic kidney disease      Dyslipidemia      HTN  (hypertension)      Hyperparathyroidism (H)      Nephrolithiasis      Renal mass        Past Surgical History  Past Surgical History:   Procedure Laterality Date     ANKLE SURGERY Right 2019    schwannoma removed from ankle     EXPLORE NECK  2011    Procedure:EXPLORE NECK; Surgeon:LUIS ADAM; Location:[facility]     EXTRACORPOREAL SHOCK WAVE LITHOTRIPSY (ESWL)       PARATHYROIDECTOMY  2011    Procedure:PARATHYROIDECTOMY; Left inferior Parathyroidectomy; Surgeon:LUIS ADAM; Location:[facility]     TUBAL LIGATION         Prior to Admission Medications  Current Outpatient Medications   Medication Sig Dispense Refill     aspirin (ASA) 81 MG chewable tablet Take 81 mg by mouth as needed for moderate pain (4-6)       atorvastatin (LIPITOR) 20 MG tablet Take 20 mg by mouth At Bedtime       LORazepam (ATIVAN) 0.5 MG tablet Take 0.5 mg by mouth every 6 hours as needed for anxiety       metoprolol succinate ER (TOPROL XL) 50 MG 24 hr tablet Take 50 mg by mouth At Bedtime         Allergies  Allergies   Allergen Reactions     Morphine [Morphine Sulfate-Nacl] Nausea and Vomiting     Cipro [Ciprofloxacin Hydrochloride]      Muscle weakness     Nuts      (Filberts) Throat swells       Social History  Social History     Socioeconomic History     Marital status:      Spouse name: Not on file     Number of children: Not on file     Years of education: Not on file     Highest education level: Not on file   Occupational History     Not on file   Tobacco Use     Smoking status: Former     Packs/day: 0.50     Types: Cigarettes     Quit date: 2011     Years since quittin.9     Smokeless tobacco: Not on file   Substance and Sexual Activity     Alcohol use: Yes     Comment: Rare     Drug use: Yes     Types: Marijuana     Comment: Once in a while     Sexual activity: Not on file   Other Topics Concern     Parent/sibling w/ CABG, MI or angioplasty before 65F 55M? Not Asked   Social History Narrative      Not on file     Social Determinants of Health     Financial Resource Strain: Not on file   Food Insecurity: Not on file   Transportation Needs: Not on file   Physical Activity: Not on file   Stress: Not on file   Social Connections: Not on file   Intimate Partner Violence: Not on file   Housing Stability: Not on file       Family History  Family History   Problem Relation Age of Onset     Ovarian Cancer Mother      Hypertension Mother      Lung Cancer Brother      Arthritis Maternal Grandmother      Cerebrovascular Disease Maternal Grandmother      Diabetes Paternal Grandmother      Cancer Maternal Aunt      Anesthesia Reaction No family hx of      Clotting Disorder No family hx of        Review of Systems  The complete review of systems is negative other than noted in the HPI or here.   Anesthesia Evaluation   Pt has had prior anesthetic. Type: General.    No history of anesthetic complications       ROS/MED HX  ENT/Pulmonary:     (+) JAISON risk factors, hypertension, tobacco use, Past use,  (-) recent URI   Neurologic:    (-) no seizures and no CVA   Cardiovascular: Comment: Has prn ASA but will hold for surgery    (+) Dyslipidemia hypertension-----Previous cardiac testing   Echo: Date: Results:    Stress Test: Date: Results:    ECG Reviewed: Date: 2019 Results:  NSR  Cath: Date: Results:   (-) taking anticoagulants/antiplatelets and arrhythmias   METS/Exercise Tolerance: >4 METS Comment: Able to walk 2 blocks and climb a flight of stairs without exertional symptoms. Reports if ran up the stairs she would probably have some shortness of breath.    Currently limited by sciatica. Used to be able to walk miles   Hematologic: Comments: Thinks she had a blood transfusion as baby due to Rh factor    (+) history of blood transfusion, no previous transfusion reaction,  (-) history of blood clots   Musculoskeletal:  - neg musculoskeletal ROS     GI/Hepatic:    (-) GERD   Renal/Genitourinary:     (+) renal disease, type: CRI,  Pt does not require dialysis,     Endo: Comment: Hyperparathyroidism, s/p parathyroidectomy in 2011. Last calcium 11.3      Psychiatric/Substance Use:     (+) psychiatric history anxiety Recreational drug usage: Cannabis (occ).    Infectious Disease:  - neg infectious disease ROS     Malignancy: Comment: Right renal mass  (+) Malignancy, History of Other.Other CA papillary RCC Active status post.    Other:  - neg other ROS          Virtual visit -  No vitals were obtained    Physical Exam  Constitutional: Awake, alert, no apparent distress, and appears stated age.  HENT: Normocephalic  Respiratory: non labored breathing; no cough   Neurologic: Oriented to name, place and time.   Neuropsychiatric: Calm, cooperative. Normal affect.      Prior Labs/Diagnostic Studies   All labs and imaging personally reviewed   Lab Results   Component Value Date    WBC 5.4 02/15/2023     Lab Results   Component Value Date    RBC 4.40 02/15/2023     Lab Results   Component Value Date    HGB 13.4 02/15/2023     Lab Results   Component Value Date    HCT 42.1 02/15/2023     Lab Results   Component Value Date    MCV 96 02/15/2023     Lab Results   Component Value Date    MCH 30.5 02/15/2023     Lab Results   Component Value Date    MCHC 31.8 02/15/2023     Lab Results   Component Value Date    RDW 12.9 02/15/2023     Lab Results   Component Value Date     02/15/2023     Last Comprehensive Metabolic Panel:  Sodium   Date Value Ref Range Status   02/15/2023 141 136 - 145 mmol/L Final     Potassium   Date Value Ref Range Status   02/15/2023 4.9 3.4 - 5.3 mmol/L Final     Chloride   Date Value Ref Range Status   02/15/2023 108 (H) 98 - 107 mmol/L Final     Carbon Dioxide (CO2)   Date Value Ref Range Status   02/15/2023 22 22 - 29 mmol/L Final     Anion Gap   Date Value Ref Range Status   02/15/2023 11 7 - 15 mmol/L Final     Glucose   Date Value Ref Range Status   02/15/2023 110 (H) 70 - 99 mg/dL Final     Urea Nitrogen   Date Value Ref  Range Status   02/15/2023 37.0 (H) 8.0 - 23.0 mg/dL Final     Creatinine   Date Value Ref Range Status   02/15/2023 2.15 (H) 0.51 - 0.95 mg/dL Final     GFR Estimate   Date Value Ref Range Status   02/15/2023 24 (L) >60 mL/min/1.73m2 Final     Comment:     eGFR calculated using  CKD-EPI equation.     Calcium   Date Value Ref Range Status   02/15/2023 11.3 (H) 8.8 - 10.2 mg/dL Final   2011 10.8 (H) 8.5 - 10.4 mg/dL Final     Bilirubin Total   Date Value Ref Range Status   2022 0.4 <=1.2 mg/dL Final     Alkaline Phosphatase   Date Value Ref Range Status   2022 79 35 - 104 U/L Final     ALT   Date Value Ref Range Status   2022 19 10 - 35 U/L Final     AST   Date Value Ref Range Status   2022 22 10 - 35 U/L Final     2/15/23 INR 0.93    EK Normal sinus rhythm    2/3/23 CT chest  Minimal coronary artery  atherosclerosis is present.   IMPRESSION:   1.  No findings specific for metastatic disease in the chest.  2.  A few tiny 2 to 3 mm pulmonary nodules of doubtful significance.    MR Renal 23                                                                   IMPRESSION:   1.  Prominent solid right lower renal mass showing heterogeneous  enhancement worrisome for renal neoplasm such as renal cell carcinoma.  Recommend further oncologic workup.  2.  Numerous additional bilateral simple and hemorrhagic renal cysts.  3.  Small hepatic nodules are technically nonspecific. This is not a  formal hepatic MRI. Consider performing 6 month surveillance liver MRI  for assessment.     CT abd/pelvis 22                                             IMPRESSION:   1.  Acute, uncomplicated sigmoid diverticulitis.   2.  Incidental solid appearing 4.9 cm right lower pole renal mass, highly suspicious for neoplasm. There are some additional smaller indeterminate renal lesions. Recommend further characterization with contrast-enhanced MRI on a nonemergent basis and   referral to urology.    The  "patient's records and results personally reviewed by this provider.     Outside records reviewed from: Care Everywhere      Assessment      Falguni Bennett is a 72 year old female seen as a PAC referral for risk assessment and optimization for anesthesia.    Plan/Recommendations  Pt will be optimized for the proposed procedure.  See below for details on the assessment, risk, and preoperative recommendations    NEUROLOGY  - No history of TIA, CVA or seizure    -Post Op delirium risk factors:  High co-morbid index    ENT  - No current airway concerns.  Will need to be reassessed day of surgery.  Mallampati: Unable to assess  TM: Unable to assess   Upper partial    CARDIAC  HLD. Atorvastatin at HS. HYPERTENSION reported in good control. Metoprolol at HS. No other cardiac history, symptoms or meds. CT chest showed minimal coronary calcifications. Able to walk at least 2 blocks and walk up a flight of stairs without exertional symptoms. Had been walking miles but is now bothered by sciatica.   - METS (Metabolic Equivalents)>4    RCRI: 6.6% risk of serious cardiac events    PULMONARY  Denies asthma, cough or shortness of breath  Low risk for JAISON  - Tobacco History      History   Smoking Status     Former     Packs/day: 0.50     Types: Cigarettes     Quit date: 4/4/2011   Smokeless Tobacco     Not on file       GI: Denies GERD  Diverticulitis  PONV Medium Risk  Total Score: 2           1 AN PONV: Pt is Female    1 AN PONV: Patient is not a current smoker        /RENAL  - Baseline Creatinine  Last 2.15  - CKD    ENDOCRINE  - BMI: Estimated body mass index is 28.39 kg/m  as calculated from the following:    Height as of 2/15/23: 1.638 m (5' 4.5\").    Weight as of 2/15/23: 76.2 kg (167 lb 15.9 oz).  Overweight (BMI 25.0-29.9)  - No history of Diabetes Mellitus Recent random blood glucose readings 109-110.  - Hyperparathyroidism s/p left inferior parathyroidectomy in 2011. Last calcium 11.3    HEME  VTE Low Risk 0.26%        "     Total Score: 0      Denies personal or family history of blood clots  Believes she has had a blood transfusion as a baby for Rh factor. Will have patient obtain updated type and screen prior to surgery.     MSK: Patient is not frail  Sciatica symptoms, as above.     PSYCH  - Anxiety with prn lorazepam     Final plan will be decided by the assigned anesthesia provider on the date of service.    Patient was discussed with Dr. Smith Will proceed    The patient is optimized for their procedure. AVS with information on surgery time/arrival time, meds and NPO status given by nursing staff. No further diagnostic testing indicated.    Please refer to the physical examination documented by the anesthesiologist in the anesthesia record on the day of surgery.    Video-Visit Details    Type of service:  Video Visit    Provider received verbal consent for a Video Visit from the patient? Yes   Video Start Time: 9:58am   Video End Time: 10:06am     Originating Location (pt. Location): Home    Distant Location (provider location):  Off-site  Mode of Communication:  Video Conference via AmVR1  On the day of service:     Prep time: 16 minutes  Visit time: 8 minutes  Documentation time: 23 minutes  ------------------------------------------  Total time: 47 minutes      NIKHIL Whitaker CNS  Preoperative Assessment Center  [facility]  Cl[facility] and [facility]  Phone: [phone]  Fax: [phone]

## 2023-03-09 NOTE — TELEPHONE ENCOUNTER
Updated Falguni of her lab appointment on 3/10 in [facility] at 11:45 am.  Falguni expressed understanding.  Estefani Brower RN

## 2023-03-09 NOTE — PATIENT INSTRUCTIONS
Preparing for Your Surgery      Name:  Falguni Bennett   MRN:  [id number]   :  1950   Today's Date:  3/9/2023       Arriving for surgery:  Surgery date:  3/13/2023  Arrival time:  5:30 AM     Surgeries and procedures: Adult patients can have 2 visitors all through the surgery process.     Visiting hours: 8 a.m. to 8:30 p.m.     Hospital: Adult patients and children under age 18 can have 4 visitor at a time     No visitors under the age of 5 are allowed for hospital patients.  Double occupancy rooms: Patients can have only two visitors at a time.     Patients with disabilities: Can have a support person with them (family member, service provider     Or someone well informed about their needs) plus the allowed number of visitors     Patients confirmed or suspected to have symptoms of COVID 19 or flu:     No visitors allowed for adult patients.   Children (under age 18) can have 1 named visitor.     People who are sick or showing symptoms of COVID 19 or flu:    Are not allowed to visit patients--we can only make exceptions in special situations.       Please follow these guidelines for your visit:   Arrive wearing a mask over your mouth and nose; we will give you a medical mask to wear    If you arrive wearing a cloth mask.   Keep it on during your entire visit, even when in patient's room.   If you don't wear a mask we'll ask you to leave.     Clean your hands with alcohol hand . Do this when you arrive at and leave the building and patient room,    And again after you touch your mask or anything in the room.     You can t visit if you have a fever, cough, shortness of breath, muscle aches, headaches, sore throat    Or diarrhea      Stay 6 feet away from others during your visit and between visits     Go directly to and from the room you are visiting.     Stay in the patient s room during your visit. Limit going to other places in the hospital as much as possible     Leave bags and jackets at home or  in the car.     For everyone s health, please don t come and go during your visit. That includes for smoking   during your visit.     Please come to:     [facility] [address]  [address] [place]  [address]    -   Parking is available in the Patient Visitor Ramp on [address] and [facility].     -   When entering the hospital you will be asked COVID screening questions, you will then be directed to Registration.  Registration will direct you to the 3rd floor Surgery waiting room.     -   Please ask if you need an escort or a wheelchair to the Surgery Waiting Room.  Preop number- [phone]      What can I eat or drink?  -  You may eat and drink normally up to 8 hours prior to arrival time. (Until 9:30 PM 3/12/2023)  -  You may have clear liquids until 2 hours prior to arrival time. (Until 3:30 AM)    Examples of clear liquids:  Water  Clear broth  Juices (apple, white grape, white cranberry  and cider) without pulp  Noncarbonated, powder based beverages  (lemonade and Floyd-Aid)  Sodas (Sprite, 7-Up, ginger ale and seltzer)  Coffee or tea (without milk or cream)  Gatorade    -  No Alcohol for at least 24 hours before surgery.     Which medicines can I take?    **Hold Aspirin for 7 days before surgery.   Hold Multivitamins for 7 days before surgery.  Hold Supplements for 7 days before surgery.  Hold Ibuprofen (Advil, Motrin) for 1 day(s) before surgery--unless otherwise directed by surgeon.  Hold Naproxen (Aleve) for 4 days before surgery.      -  PLEASE TAKE these medications the day of surgery:  Ativan if needed    How do I prepare myself?  - Please take 2 showers (one the night prior to surgery and one the morning of surgery) using Scrubcare or Hibiclens soap.    Use this soap only from the neck to your toes.     Leave the soap on your skin for one minute--then rinse thoroughly.      You may use your own shampoo and conditioner. No other hair  products.   - Please remove all jewelry and body piercings.  - No lotions, deodorants or fragrance.  - No makeup or fingernail polish.   - Bring your ID and insurance card.    -If you have a Deep Brain Stimulator, Spinal Cord Stimulator, or any Neuro Stimulator device---you must bring the remote control to the hospital.      ALL PATIENTS GOING HOME THE SAME DAY OF SURGERY ARE REQUIRED TO HAVE A RESPONSIBLE ADULT TO DRIVE AND BE IN ATTENDANCE WITH THEM FOR 24 HOURS FOLLOWING SURGERY.    Covid testing policy as of 12/06/2022  Your surgeon will notify and schedule you for a COVID test if one is needed before surgery--please direct any questions or COVID symptoms to your surgeon      Questions or Concerns:    - For any questions regarding the day of surgery or your hospital stay, please contact the Pre Admission Nursing Office at [phone].       - If you have health changes between today and your surgery, please call your surgeon.       - For questions after surgery, please call your surgeons office.

## 2023-03-10 ENCOUNTER — LAB (OUTPATIENT)
Dept: LAB | Facility: CLINIC | Age: 73
End: 2023-03-10
Payer: COMMERCIAL

## 2023-03-10 ENCOUNTER — TELEPHONE (OUTPATIENT)
Dept: FAMILY MEDICINE | Facility: CLINIC | Age: 73
End: 2023-03-10

## 2023-03-10 DIAGNOSIS — Z01.818 PREOP EXAMINATION: ICD-10-CM

## 2023-03-10 DIAGNOSIS — N28.89 RIGHT RENAL MASS: ICD-10-CM

## 2023-03-10 PROCEDURE — 86850 RBC ANTIBODY SCREEN: CPT

## 2023-03-10 PROCEDURE — 36415 COLL VENOUS BLD VENIPUNCTURE: CPT

## 2023-03-10 PROCEDURE — 86900 BLOOD TYPING SEROLOGIC ABO: CPT

## 2023-03-10 PROCEDURE — 86901 BLOOD TYPING SEROLOGIC RH(D): CPT

## 2023-03-10 NOTE — TELEPHONE ENCOUNTER
Patient calling regarding lab appointment  States she is not fasting  The labs she has ordered does not require her to fast    Bibiana Alfredo RN on 3/10/2023 at 11:03 AM

## 2023-03-13 ENCOUNTER — HOSPITAL ENCOUNTER (INPATIENT)
Facility: CLINIC | Age: 73
LOS: 1 days | Discharge: HOME | DRG: 657 | End: 2023-03-14
Attending: UROLOGY | Admitting: UROLOGY
Payer: COMMERCIAL

## 2023-03-13 ENCOUNTER — ANESTHESIA (OUTPATIENT)
Dept: SURGERY | Facility: CLINIC | Age: 73
DRG: 657 | End: 2023-03-13
Payer: COMMERCIAL

## 2023-03-13 DIAGNOSIS — Z98.890 POST-OPERATIVE STATE: Primary | ICD-10-CM

## 2023-03-13 LAB
ANION GAP SERPL CALCULATED.3IONS-SCNC: 11 MMOL/L (ref 7–15)
BUN SERPL-MCNC: 31.8 MG/DL (ref 8–23)
CALCIUM SERPL-MCNC: 11.2 MG/DL (ref 8.8–10.2)
CHLORIDE SERPL-SCNC: 108 MMOL/L (ref 98–107)
CREAT SERPL-MCNC: 2.24 MG/DL (ref 0.51–0.95)
DEPRECATED HCO3 PLAS-SCNC: 23 MMOL/L (ref 22–29)
GFR SERPL CREATININE-BSD FRML MDRD: 23 ML/MIN/1.73M2
GLUCOSE SERPL-MCNC: 107 MG/DL (ref 70–99)
POTASSIUM SERPL-SCNC: 4.2 MMOL/L (ref 3.4–5.3)
SODIUM SERPL-SCNC: 142 MMOL/L (ref 136–145)

## 2023-03-13 PROCEDURE — 250N000013 HC RX MED GY IP 250 OP 250 PS 637: Performed by: STUDENT IN AN ORGANIZED HEALTH CARE EDUCATION/TRAINING PROGRAM

## 2023-03-13 PROCEDURE — 250N000011 HC RX IP 250 OP 636: Performed by: ANESTHESIOLOGY

## 2023-03-13 PROCEDURE — 250N000025 HC SEVOFLURANE, PER MIN: Performed by: UROLOGY

## 2023-03-13 PROCEDURE — 36415 COLL VENOUS BLD VENIPUNCTURE: CPT | Performed by: CLINICAL NURSE SPECIALIST

## 2023-03-13 PROCEDURE — 8E0W4CZ ROBOTIC ASSISTED PROCEDURE OF TRUNK REGION, PERCUTANEOUS ENDOSCOPIC APPROACH: ICD-10-PCS | Performed by: UROLOGY

## 2023-03-13 PROCEDURE — 250N000011 HC RX IP 250 OP 636: Performed by: UROLOGY

## 2023-03-13 PROCEDURE — 710N000009 HC RECOVERY PHASE 1, LEVEL 1, PER MIN: Performed by: UROLOGY

## 2023-03-13 PROCEDURE — 250N000011 HC RX IP 250 OP 636: Performed by: STUDENT IN AN ORGANIZED HEALTH CARE EDUCATION/TRAINING PROGRAM

## 2023-03-13 PROCEDURE — 250N000013 HC RX MED GY IP 250 OP 250 PS 637: Performed by: ANESTHESIOLOGY

## 2023-03-13 PROCEDURE — 370N000017 HC ANESTHESIA TECHNICAL FEE, PER MIN: Performed by: UROLOGY

## 2023-03-13 PROCEDURE — 50543 LAPARO PARTIAL NEPHRECTOMY: CPT | Mod: RT | Performed by: UROLOGY

## 2023-03-13 PROCEDURE — 120N000002 HC R&B MED SURG/OB UMMC

## 2023-03-13 PROCEDURE — 82310 ASSAY OF CALCIUM: CPT | Performed by: CLINICAL NURSE SPECIALIST

## 2023-03-13 PROCEDURE — 258N000003 HC RX IP 258 OP 636: Performed by: ANESTHESIOLOGY

## 2023-03-13 PROCEDURE — 76998 US GUIDE INTRAOP: CPT | Mod: 26 | Performed by: UROLOGY

## 2023-03-13 PROCEDURE — 999N000141 HC STATISTIC PRE-PROCEDURE NURSING ASSESSMENT: Performed by: UROLOGY

## 2023-03-13 PROCEDURE — 250N000009 HC RX 250: Performed by: ANESTHESIOLOGY

## 2023-03-13 PROCEDURE — 88307 TISSUE EXAM BY PATHOLOGIST: CPT | Mod: TC | Performed by: UROLOGY

## 2023-03-13 PROCEDURE — 88307 TISSUE EXAM BY PATHOLOGIST: CPT | Mod: 26 | Performed by: PATHOLOGY

## 2023-03-13 PROCEDURE — 272N000001 HC OR GENERAL SUPPLY STERILE: Performed by: UROLOGY

## 2023-03-13 PROCEDURE — 0TB04ZZ EXCISION OF RIGHT KIDNEY, PERCUTANEOUS ENDOSCOPIC APPROACH: ICD-10-PCS | Performed by: UROLOGY

## 2023-03-13 PROCEDURE — 258N000003 HC RX IP 258 OP 636: Performed by: STUDENT IN AN ORGANIZED HEALTH CARE EDUCATION/TRAINING PROGRAM

## 2023-03-13 PROCEDURE — 360N000080 HC SURGERY LEVEL 7, PER MIN: Performed by: UROLOGY

## 2023-03-13 RX ORDER — LIDOCAINE HYDROCHLORIDE 20 MG/ML
INJECTION, SOLUTION INFILTRATION; PERINEURAL PRN
Status: DISCONTINUED | OUTPATIENT
Start: 2023-03-13 | End: 2023-03-13

## 2023-03-13 RX ORDER — SODIUM CHLORIDE 9 MG/ML
INJECTION, SOLUTION INTRAVENOUS CONTINUOUS
Status: DISCONTINUED | OUTPATIENT
Start: 2023-03-13 | End: 2023-03-14 | Stop reason: HOSPADM

## 2023-03-13 RX ORDER — CEFAZOLIN SODIUM/WATER 2 G/20 ML
2 SYRINGE (ML) INTRAVENOUS SEE ADMIN INSTRUCTIONS
Status: DISCONTINUED | OUTPATIENT
Start: 2023-03-13 | End: 2023-03-13 | Stop reason: HOSPADM

## 2023-03-13 RX ORDER — FENTANYL CITRATE 50 UG/ML
25 INJECTION, SOLUTION INTRAMUSCULAR; INTRAVENOUS EVERY 5 MIN PRN
Status: DISCONTINUED | OUTPATIENT
Start: 2023-03-13 | End: 2023-03-13 | Stop reason: HOSPADM

## 2023-03-13 RX ORDER — NALOXONE HYDROCHLORIDE 0.4 MG/ML
0.2 INJECTION, SOLUTION INTRAMUSCULAR; INTRAVENOUS; SUBCUTANEOUS
Status: DISCONTINUED | OUTPATIENT
Start: 2023-03-13 | End: 2023-03-14 | Stop reason: HOSPADM

## 2023-03-13 RX ORDER — OXYCODONE HYDROCHLORIDE 10 MG/1
10 TABLET ORAL
Status: DISCONTINUED | OUTPATIENT
Start: 2023-03-13 | End: 2023-03-13 | Stop reason: HOSPADM

## 2023-03-13 RX ORDER — SODIUM CHLORIDE, SODIUM LACTATE, POTASSIUM CHLORIDE, CALCIUM CHLORIDE 600; 310; 30; 20 MG/100ML; MG/100ML; MG/100ML; MG/100ML
INJECTION, SOLUTION INTRAVENOUS CONTINUOUS PRN
Status: DISCONTINUED | OUTPATIENT
Start: 2023-03-13 | End: 2023-03-13

## 2023-03-13 RX ORDER — ASPIRIN 81 MG/1
81 TABLET, CHEWABLE ORAL ONCE
Status: COMPLETED | OUTPATIENT
Start: 2023-03-13 | End: 2023-03-13

## 2023-03-13 RX ORDER — HYDROMORPHONE HCL IN WATER/PF 6 MG/30 ML
0.2 PATIENT CONTROLLED ANALGESIA SYRINGE INTRAVENOUS EVERY 5 MIN PRN
Status: DISCONTINUED | OUTPATIENT
Start: 2023-03-13 | End: 2023-03-13 | Stop reason: HOSPADM

## 2023-03-13 RX ORDER — FENTANYL CITRATE 50 UG/ML
INJECTION, SOLUTION INTRAMUSCULAR; INTRAVENOUS PRN
Status: DISCONTINUED | OUTPATIENT
Start: 2023-03-13 | End: 2023-03-13

## 2023-03-13 RX ORDER — ACETAMINOPHEN 325 MG/1
975 TABLET ORAL EVERY 8 HOURS
Status: DISCONTINUED | OUTPATIENT
Start: 2023-03-13 | End: 2023-03-14 | Stop reason: HOSPADM

## 2023-03-13 RX ORDER — SODIUM CHLORIDE, SODIUM LACTATE, POTASSIUM CHLORIDE, CALCIUM CHLORIDE 600; 310; 30; 20 MG/100ML; MG/100ML; MG/100ML; MG/100ML
INJECTION, SOLUTION INTRAVENOUS CONTINUOUS
Status: DISCONTINUED | OUTPATIENT
Start: 2023-03-13 | End: 2023-03-13 | Stop reason: HOSPADM

## 2023-03-13 RX ORDER — KETOROLAC TROMETHAMINE 30 MG/ML
15 INJECTION, SOLUTION INTRAMUSCULAR; INTRAVENOUS
Status: DISCONTINUED | OUTPATIENT
Start: 2023-03-13 | End: 2023-03-13 | Stop reason: HOSPADM

## 2023-03-13 RX ORDER — DEXAMETHASONE SODIUM PHOSPHATE 4 MG/ML
INJECTION, SOLUTION INTRA-ARTICULAR; INTRALESIONAL; INTRAMUSCULAR; INTRAVENOUS; SOFT TISSUE PRN
Status: DISCONTINUED | OUTPATIENT
Start: 2023-03-13 | End: 2023-03-13

## 2023-03-13 RX ORDER — NALOXONE HYDROCHLORIDE 0.4 MG/ML
0.4 INJECTION, SOLUTION INTRAMUSCULAR; INTRAVENOUS; SUBCUTANEOUS
Status: DISCONTINUED | OUTPATIENT
Start: 2023-03-13 | End: 2023-03-14 | Stop reason: HOSPADM

## 2023-03-13 RX ORDER — BUPIVACAINE HYDROCHLORIDE 2.5 MG/ML
INJECTION, SOLUTION INFILTRATION; PERINEURAL PRN
Status: DISCONTINUED | OUTPATIENT
Start: 2023-03-13 | End: 2023-03-13 | Stop reason: HOSPADM

## 2023-03-13 RX ORDER — ONDANSETRON 2 MG/ML
4 INJECTION INTRAMUSCULAR; INTRAVENOUS EVERY 30 MIN PRN
Status: DISCONTINUED | OUTPATIENT
Start: 2023-03-13 | End: 2023-03-13 | Stop reason: HOSPADM

## 2023-03-13 RX ORDER — AMOXICILLIN 250 MG
1 CAPSULE ORAL AT BEDTIME
Status: DISCONTINUED | OUTPATIENT
Start: 2023-03-13 | End: 2023-03-14 | Stop reason: HOSPADM

## 2023-03-13 RX ORDER — OXYCODONE HYDROCHLORIDE 5 MG/1
5 TABLET ORAL EVERY 4 HOURS PRN
Status: DISCONTINUED | OUTPATIENT
Start: 2023-03-13 | End: 2023-03-14 | Stop reason: HOSPADM

## 2023-03-13 RX ORDER — LORAZEPAM 0.5 MG/1
0.5 TABLET ORAL EVERY 6 HOURS PRN
Status: DISCONTINUED | OUTPATIENT
Start: 2023-03-13 | End: 2023-03-14 | Stop reason: HOSPADM

## 2023-03-13 RX ORDER — ONDANSETRON 4 MG/1
4 TABLET, ORALLY DISINTEGRATING ORAL EVERY 6 HOURS PRN
Status: DISCONTINUED | OUTPATIENT
Start: 2023-03-13 | End: 2023-03-14 | Stop reason: HOSPADM

## 2023-03-13 RX ORDER — OXYCODONE HYDROCHLORIDE 5 MG/1
5 TABLET ORAL
Status: COMPLETED | OUTPATIENT
Start: 2023-03-13 | End: 2023-03-13

## 2023-03-13 RX ORDER — HYDROMORPHONE HCL IN WATER/PF 6 MG/30 ML
0.2 PATIENT CONTROLLED ANALGESIA SYRINGE INTRAVENOUS
Status: DISCONTINUED | OUTPATIENT
Start: 2023-03-13 | End: 2023-03-14 | Stop reason: HOSPADM

## 2023-03-13 RX ORDER — KETAMINE HYDROCHLORIDE 10 MG/ML
INJECTION INTRAMUSCULAR; INTRAVENOUS PRN
Status: DISCONTINUED | OUTPATIENT
Start: 2023-03-13 | End: 2023-03-13

## 2023-03-13 RX ORDER — GLYCOPYRROLATE 0.2 MG/ML
INJECTION, SOLUTION INTRAMUSCULAR; INTRAVENOUS PRN
Status: DISCONTINUED | OUTPATIENT
Start: 2023-03-13 | End: 2023-03-13

## 2023-03-13 RX ORDER — ONDANSETRON 4 MG/1
4 TABLET, ORALLY DISINTEGRATING ORAL EVERY 30 MIN PRN
Status: DISCONTINUED | OUTPATIENT
Start: 2023-03-13 | End: 2023-03-13 | Stop reason: HOSPADM

## 2023-03-13 RX ORDER — PROPOFOL 10 MG/ML
INJECTION, EMULSION INTRAVENOUS PRN
Status: DISCONTINUED | OUTPATIENT
Start: 2023-03-13 | End: 2023-03-13

## 2023-03-13 RX ORDER — ATORVASTATIN CALCIUM 20 MG/1
20 TABLET, FILM COATED ORAL AT BEDTIME
Status: DISCONTINUED | OUTPATIENT
Start: 2023-03-13 | End: 2023-03-14 | Stop reason: HOSPADM

## 2023-03-13 RX ORDER — HYDROMORPHONE HCL IN WATER/PF 6 MG/30 ML
0.4 PATIENT CONTROLLED ANALGESIA SYRINGE INTRAVENOUS EVERY 5 MIN PRN
Status: DISCONTINUED | OUTPATIENT
Start: 2023-03-13 | End: 2023-03-13 | Stop reason: HOSPADM

## 2023-03-13 RX ORDER — CEFAZOLIN SODIUM/WATER 2 G/20 ML
2 SYRINGE (ML) INTRAVENOUS
Status: COMPLETED | OUTPATIENT
Start: 2023-03-13 | End: 2023-03-13

## 2023-03-13 RX ORDER — ONDANSETRON 2 MG/ML
4 INJECTION INTRAMUSCULAR; INTRAVENOUS EVERY 6 HOURS PRN
Status: DISCONTINUED | OUTPATIENT
Start: 2023-03-13 | End: 2023-03-14 | Stop reason: HOSPADM

## 2023-03-13 RX ORDER — PROCHLORPERAZINE MALEATE 5 MG
5 TABLET ORAL EVERY 6 HOURS PRN
Status: DISCONTINUED | OUTPATIENT
Start: 2023-03-13 | End: 2023-03-14 | Stop reason: HOSPADM

## 2023-03-13 RX ORDER — METOPROLOL SUCCINATE 50 MG/1
50 TABLET, EXTENDED RELEASE ORAL AT BEDTIME
Status: DISCONTINUED | OUTPATIENT
Start: 2023-03-13 | End: 2023-03-14 | Stop reason: HOSPADM

## 2023-03-13 RX ORDER — FENTANYL CITRATE 50 UG/ML
50 INJECTION, SOLUTION INTRAMUSCULAR; INTRAVENOUS EVERY 5 MIN PRN
Status: DISCONTINUED | OUTPATIENT
Start: 2023-03-13 | End: 2023-03-13 | Stop reason: HOSPADM

## 2023-03-13 RX ORDER — LIDOCAINE 40 MG/G
CREAM TOPICAL
Status: DISCONTINUED | OUTPATIENT
Start: 2023-03-13 | End: 2023-03-13 | Stop reason: HOSPADM

## 2023-03-13 RX ORDER — LIDOCAINE 40 MG/G
CREAM TOPICAL
Status: DISCONTINUED | OUTPATIENT
Start: 2023-03-13 | End: 2023-03-14 | Stop reason: HOSPADM

## 2023-03-13 RX ORDER — ONDANSETRON 2 MG/ML
INJECTION INTRAMUSCULAR; INTRAVENOUS PRN
Status: DISCONTINUED | OUTPATIENT
Start: 2023-03-13 | End: 2023-03-13

## 2023-03-13 RX ADMIN — HYDROMORPHONE HYDROCHLORIDE 0.2 MG: 0.2 INJECTION, SOLUTION INTRAMUSCULAR; INTRAVENOUS; SUBCUTANEOUS at 19:10

## 2023-03-13 RX ADMIN — SODIUM CHLORIDE, POTASSIUM CHLORIDE, SODIUM LACTATE AND CALCIUM CHLORIDE: 600; 310; 30; 20 INJECTION, SOLUTION INTRAVENOUS at 07:36

## 2023-03-13 RX ADMIN — GLYCOPYRROLATE 0.2 MG: 0.2 INJECTION, SOLUTION INTRAMUSCULAR; INTRAVENOUS at 08:38

## 2023-03-13 RX ADMIN — Medication 20 MG: at 07:42

## 2023-03-13 RX ADMIN — ONDANSETRON 4 MG: 2 INJECTION INTRAMUSCULAR; INTRAVENOUS at 20:52

## 2023-03-13 RX ADMIN — OXYCODONE HYDROCHLORIDE 5 MG: 5 TABLET ORAL at 16:35

## 2023-03-13 RX ADMIN — ONDANSETRON 4 MG: 2 INJECTION INTRAMUSCULAR; INTRAVENOUS at 12:04

## 2023-03-13 RX ADMIN — FENTANYL CITRATE 50 MCG: 50 INJECTION, SOLUTION INTRAMUSCULAR; INTRAVENOUS at 14:30

## 2023-03-13 RX ADMIN — PHENYLEPHRINE HYDROCHLORIDE 100 MCG: 10 INJECTION INTRAVENOUS at 08:33

## 2023-03-13 RX ADMIN — FENTANYL CITRATE 50 MCG: 50 INJECTION, SOLUTION INTRAMUSCULAR; INTRAVENOUS at 12:16

## 2023-03-13 RX ADMIN — MIDAZOLAM 2 MG: 1 INJECTION INTRAMUSCULAR; INTRAVENOUS at 07:32

## 2023-03-13 RX ADMIN — Medication 50 MG: at 07:42

## 2023-03-13 RX ADMIN — SODIUM CHLORIDE, POTASSIUM CHLORIDE, SODIUM LACTATE AND CALCIUM CHLORIDE: 600; 310; 30; 20 INJECTION, SOLUTION INTRAVENOUS at 12:48

## 2023-03-13 RX ADMIN — DEXAMETHASONE SODIUM PHOSPHATE 10 MG: 4 INJECTION, SOLUTION INTRA-ARTICULAR; INTRALESIONAL; INTRAMUSCULAR; INTRAVENOUS; SOFT TISSUE at 07:42

## 2023-03-13 RX ADMIN — SUGAMMADEX 200 MG: 100 INJECTION, SOLUTION INTRAVENOUS at 12:37

## 2023-03-13 RX ADMIN — FENTANYL CITRATE 50 MCG: 50 INJECTION, SOLUTION INTRAMUSCULAR; INTRAVENOUS at 14:00

## 2023-03-13 RX ADMIN — PHENYLEPHRINE HYDROCHLORIDE 100 MCG: 10 INJECTION INTRAVENOUS at 08:53

## 2023-03-13 RX ADMIN — SODIUM CHLORIDE: 9 INJECTION, SOLUTION INTRAVENOUS at 19:09

## 2023-03-13 RX ADMIN — Medication 10 MG: at 10:18

## 2023-03-13 RX ADMIN — Medication 10 MG: at 11:29

## 2023-03-13 RX ADMIN — SENNOSIDES AND DOCUSATE SODIUM 1 TABLET: 50; 8.6 TABLET ORAL at 22:25

## 2023-03-13 RX ADMIN — PHENYLEPHRINE HYDROCHLORIDE 100 MCG: 10 INJECTION INTRAVENOUS at 12:13

## 2023-03-13 RX ADMIN — FENTANYL CITRATE 50 MCG: 50 INJECTION, SOLUTION INTRAMUSCULAR; INTRAVENOUS at 08:27

## 2023-03-13 RX ADMIN — PHENYLEPHRINE HYDROCHLORIDE 100 MCG: 10 INJECTION INTRAVENOUS at 10:34

## 2023-03-13 RX ADMIN — METOPROLOL SUCCINATE 50 MG: 50 TABLET, EXTENDED RELEASE ORAL at 22:26

## 2023-03-13 RX ADMIN — ATORVASTATIN CALCIUM 20 MG: 20 TABLET, FILM COATED ORAL at 22:26

## 2023-03-13 RX ADMIN — HYDROMORPHONE HYDROCHLORIDE 0.2 MG: 0.2 INJECTION, SOLUTION INTRAMUSCULAR; INTRAVENOUS; SUBCUTANEOUS at 17:14

## 2023-03-13 RX ADMIN — LIDOCAINE HYDROCHLORIDE 80 MG: 20 INJECTION, SOLUTION INFILTRATION; PERINEURAL at 07:42

## 2023-03-13 RX ADMIN — PROPOFOL 130 MG: 10 INJECTION, EMULSION INTRAVENOUS at 07:42

## 2023-03-13 RX ADMIN — ACETAMINOPHEN 975 MG: 325 TABLET ORAL at 14:45

## 2023-03-13 RX ADMIN — PHENYLEPHRINE HYDROCHLORIDE 100 MCG: 10 INJECTION INTRAVENOUS at 09:06

## 2023-03-13 RX ADMIN — ACETAMINOPHEN 975 MG: 325 TABLET ORAL at 22:25

## 2023-03-13 RX ADMIN — SODIUM CHLORIDE, POTASSIUM CHLORIDE, SODIUM LACTATE AND CALCIUM CHLORIDE: 600; 310; 30; 20 INJECTION, SOLUTION INTRAVENOUS at 12:00

## 2023-03-13 RX ADMIN — Medication 20 MG: at 10:18

## 2023-03-13 RX ADMIN — PHENYLEPHRINE HYDROCHLORIDE 100 MCG: 10 INJECTION INTRAVENOUS at 08:38

## 2023-03-13 RX ADMIN — Medication 10 MG: at 09:02

## 2023-03-13 RX ADMIN — PHENYLEPHRINE HYDROCHLORIDE 0.2 MCG/KG/MIN: 10 INJECTION INTRAVENOUS at 09:12

## 2023-03-13 RX ADMIN — Medication 2 G: at 11:53

## 2023-03-13 RX ADMIN — HYDROMORPHONE HYDROCHLORIDE 0.5 MG: 1 INJECTION, SOLUTION INTRAMUSCULAR; INTRAVENOUS; SUBCUTANEOUS at 12:02

## 2023-03-13 RX ADMIN — Medication 2 G: at 07:53

## 2023-03-13 RX ADMIN — Medication 20 MG: at 08:55

## 2023-03-13 ASSESSMENT — ACTIVITIES OF DAILY LIVING (ADL)
DOING_ERRANDS_INDEPENDENTLY_DIFFICULTY: NO
ADLS_ACUITY_SCORE: 35
TOILETING_ISSUES: NO
CONCENTRATING,_REMEMBERING_OR_MAKING_DECISIONS_DIFFICULTY: NO
ADLS_ACUITY_SCORE: 35
ADLS_ACUITY_SCORE: 41
ADLS_ACUITY_SCORE: 35
ADLS_ACUITY_SCORE: 28
DIFFICULTY_EATING/SWALLOWING: NO
PATIENT'S_PREFERRED_MEANS_OF_COMMUNICATION: OTHER
ADLS_ACUITY_SCORE: 35
ADLS_ACUITY_SCORE: 35
ADLS_ACUITY_SCORE: 26
DRESSING/BATHING_DIFFICULTY: NO
HEARING_DIFFICULTY_OR_DEAF: OTHER (SEE COMMENTS)
CHANGE_IN_FUNCTIONAL_STATUS_SINCE_ONSET_OF_CURRENT_ILLNESS/INJURY: NO
WALKING_OR_CLIMBING_STAIRS_DIFFICULTY: NO
WERE_AUXILIARY_AIDS_OFFERED?: NO
ADLS_ACUITY_SCORE: 35
DIFFICULTY_COMMUNICATING: NO
FALL_HISTORY_WITHIN_LAST_SIX_MONTHS: NO

## 2023-03-13 NOTE — ANESTHESIA PROCEDURE NOTES
Airway       Patient location during procedure: OR       Procedure Start/Stop Times: 3/13/2023 7:45 AM  Staff -        CRNA: Nick Dougherty APRN CRNA       Performed By: CRNA  Consent for Airway        Urgency: elective  Indications and Patient Condition       Indications for airway management: mary ann-procedural       Induction type:intravenous       Mask difficulty assessment: 1 - vent by mask    Final Airway Details       Final airway type: endotracheal airway       Successful airway: ETT - single  Endotracheal Airway Details        ETT size (mm): 7.0       Cuffed: yes       Successful intubation technique: direct laryngoscopy       DL Blade Type: Curran 2       Grade View of Cords: 1       Adjucts: stylet       Position: Right       Measured from: lips       Secured at (cm): 22       Bite block used: None    Post intubation assessment        Placement verified by: capnometry, equal breath sounds and chest rise        Number of attempts at approach: 1       Number of other approaches attempted: 0       Secured with: plastic tape       Ease of procedure: easy       Dentition: Intact and Unchanged    Medication(s) Administered   Medication Administration Time: 3/13/2023 7:45 AM

## 2023-03-13 NOTE — INTERVAL H&P NOTE
"I have reviewed the surgical (or preoperative) H&P that is linked to this encounter, and examined the patient. There are no significant changes    Clinical Conditions Present on Arrival:  Clinically Significant Risk Factors Present on Admission            # Hypercalcemia: Highest Ca = 11.3 mg/dL in last 30 days, will monitor as appropriate         # Overweight: Estimated body mass index is 28.84 kg/m  as calculated from the following:    Height as of this encounter: 1.626 m (5' 4\").    Weight as of this encounter: 76.2 kg (167 lb 15.9 oz).       "

## 2023-03-13 NOTE — ANESTHESIA PREPROCEDURE EVALUATION
Anesthesia Pre-Procedure Evaluation    Patient: Falguni Bennett   MRN: [id number] : 1950        Procedure : Procedure(s):  NEPHRECTOMY, PARTIAL, ROBOT-ASSISTED, LAPAROSCOPIC - RIGHT, Ultrasound          Past Medical History:   Diagnosis Date     Anxiety      Chronic kidney disease      Dyslipidemia      HTN (hypertension)      Hyperparathyroidism (H)      Nephrolithiasis      Renal mass       Past Surgical History:   Procedure Laterality Date     ANKLE SURGERY Right 2019    schwannoma removed from ankle     EXPLORE NECK  2011    Procedure:EXPLORE NECK; Surgeon:LUIS ADAM; Location:[facility]     EXTRACORPOREAL SHOCK WAVE LITHOTRIPSY (ESWL)       PARATHYROIDECTOMY  2011    Procedure:PARATHYROIDECTOMY; Left inferior Parathyroidectomy; Surgeon:LUIS ADAM; Location:[facility]     TUBAL LIGATION        Allergies   Allergen Reactions     Morphine [Morphine Sulfate-Nacl] Nausea and Vomiting     Cipro [Ciprofloxacin Hydrochloride]      Muscle weakness     Nuts      (Filberts) Throat swells      Social History     Tobacco Use     Smoking status: Former     Packs/day: 0.50     Types: Cigarettes     Quit date: 2011     Years since quittin.9     Smokeless tobacco: Not on file   Substance Use Topics     Alcohol use: Yes     Comment: Rare      Wt Readings from Last 1 Encounters:   23 76.2 kg (167 lb 15.9 oz)        Anesthesia Evaluation   Pt has had prior anesthetic. Type: General.        ROS/MED HX  ENT/Pulmonary:  - neg pulmonary ROS     Neurologic:  - neg neurologic ROS     Cardiovascular:     (+) hypertension-----    METS/Exercise Tolerance:     Hematologic:  - neg hematologic  ROS     Musculoskeletal:  - neg musculoskeletal ROS     GI/Hepatic:  - neg GI/hepatic ROS     Renal/Genitourinary:     (+) renal disease,     Endo:       Psychiatric/Substance Use:     (+) Recreational drug usage: Cannabis.    Infectious Disease:       Malignancy:   (+) Malignancy,     Other:             Physical Exam    Airway  airway exam normal           Respiratory Devices and Support         Dental       (+) Minor Abnormalities - some fillings, tiny chips      Cardiovascular   cardiovascular exam normal          Pulmonary   pulmonary exam normal                OUTSIDE LABS:  CBC:   Lab Results   Component Value Date    WBC 5.4 02/15/2023    WBC 9.5 12/30/2022    HGB 13.4 02/15/2023    HGB 13.4 12/30/2022    HCT 42.1 02/15/2023    HCT 41.6 12/30/2022     02/15/2023     12/30/2022     BMP:   Lab Results   Component Value Date     02/15/2023     12/30/2022    POTASSIUM 4.9 02/15/2023    POTASSIUM 4.5 12/30/2022    CHLORIDE 108 (H) 02/15/2023    CHLORIDE 102 12/30/2022    CO2 22 02/15/2023    CO2 24 12/30/2022    BUN 37.0 (H) 02/15/2023    BUN 39.5 (H) 12/30/2022    CR 2.15 (H) 02/15/2023    CR 2.19 (H) 12/30/2022     (H) 02/15/2023     (H) 12/30/2022     COAGS:   Lab Results   Component Value Date    INR 0.93 02/15/2023     POC: No results found for: BGM, HCG, HCGS  HEPATIC:   Lab Results   Component Value Date    ALBUMIN 4.4 12/30/2022    PROTTOTAL 7.8 12/30/2022    ALT 19 12/30/2022    AST 22 12/30/2022    ALKPHOS 79 12/30/2022    BILITOTAL 0.4 12/30/2022     OTHER:   Lab Results   Component Value Date    KAT 11.3 (H) 02/15/2023       Anesthesia Plan    ASA Status:  3   NPO Status:  NPO Appropriate    Anesthesia Type: General.     - Airway: ETT   Induction: Intravenous.   Maintenance: Balanced.        Consents    Anesthesia Plan(s) and associated risks, benefits, and realistic alternatives discussed. Questions answered and patient/representative(s) expressed understanding.     - Discussed: Risks, Benefits and Alternatives for BOTH SEDATION and the PROCEDURE were discussed     - Discussed with:  Patient         Postoperative Care    Pain management: Oral pain medications.   PONV prophylaxis: Ondansetron (or other 5HT-3), Dexamethasone or Solumedrol     Comments:                 Peter Barajas MD, MD

## 2023-03-13 NOTE — ANESTHESIA CARE TRANSFER NOTE
Patient: Falguni Bennett    Procedure: Procedure(s):  NEPHRECTOMY, PARTIAL, ROBOT-ASSISTED, LAPAROSCOPIC - RIGHT, Ultrasound       Diagnosis: Right renal mass [N28.89]  Diagnosis Additional Information: No value filed.    Anesthesia Type:   General     Note:    Oropharynx: oropharynx clear of all foreign objects  Level of Consciousness: awake  Oxygen Supplementation: face mask  Level of Supplemental Oxygen (L/min / FiO2): 8  Independent Airway: airway patency satisfactory and stable  Dentition: dentition unchanged  Vital Signs Stable: post-procedure vital signs reviewed and stable  Report to RN Given: handoff report given  Patient transferred to: PACU    Handoff Report: Identifed the Patient, Identified the Reponsible Provider, Reviewed the pertinent medical history, Discussed the surgical course, Reviewed Intra-OP anesthesia mangement and issues during anesthesia, Set expectations for post-procedure period and Allowed opportunity for questions and acknowledgement of understanding      Vitals:  Vitals Value Taken Time   /78 03/13/23 1248   Temp     Pulse 74 03/13/23 1252   Resp 10 03/13/23 1252   SpO2 100 % 03/13/23 1252   Vitals shown include unvalidated device data.    Electronically Signed By: NIKHIL Recio CRNA  March 13, 2023  12:53 PM

## 2023-03-13 NOTE — OP NOTE
OPERATIVE REPORT  3/13/2023    PREOPERATIVE DIAGNOSIS: Right-sided lower pole renal mass    POSTOPERATIVE DIAGNOSIS:  Same    PROCEDURES PERFORMED:   1. Right, Robot-assisted laparoscopic partial nephrectomy    STAFF SURGEON: Urbano Mijares MD  RESIDENT(S): Blaine Figueroa MD, Benji Orta MD  ANESTHESIA: General    ESTIMATED BLOOD LOSS: 100 cc  DRAINS/TUBES: 16Fr jaffe catheter with 10 ml in balloon.    IV FLUIDS: Please see anesthesia record  COMPLICATIONS: None.   SPECIMEN: Right kidney tumor    SIGNIFICANT FINDINGS:   1.  Gross examination of the kidney showed renal mass without significant involvement of surrounding organs. Tumor was enucleated with no clamp time given poor renal function. Capsule of tumor was left intact on tumor - no violation was seen and therefore grossly full tumor removed.  2. Full ultrasound examination of the kidney showed multiple cysts but no solid renal masses.    BRIEF OPERATIVE INDICATIONS: Falguni Bennett is a(n) 72 year old female with GFR 24, Cr 2.15. She was found to have a 4.9 cm right lower pole renal mass that was biopsy proven LG papillary RCC. Given poor renal function and appearance of mass, enucleation will be attempted off clamp. After a discussion of all risks, benefits, and alternatives, the patient elected to proceed with the above stated procedure.    OPERATIVE DETAILS: Informed consent was obtained and the patient was brought to the operating room where general anesthesia was induced. She was given appropriate preoperative antibiotics. She was initially positioned modified flank position where She was prepped and draped in the standard sterile fashion.  We were careful to pad all pressure points.  We then performed a timeout, verifying the correct patient's site and procedure to be performed.     We began by inserting the Veress needle into the abdomen. After confirmatory drop test, the abdomen was insufflated. We then proceeded to place three 8 mm  non-dilating robotic ports to triangulate the kidney, with the camera port 2/3 of the way from the edge of the rib margin to the umbilicus, and the left and right ports on either side of it.     No adhesions were noted..   A 12 mm assistant AirSeal port was also placed in the same plane of the camera port. An additional 5 mm port was needed for retraction. The robot was then docked.       We began by taking down the hepatorenal ligament and other adhesions to the liver in order to place our liver retractor (a locking laparoscopic Allis clamp).     We then mobilized the colon from the lateral aspect of the abdominal wall and reflected it medially. Gerota's fascia was then opened and the lower pole of the kidney mobilized - the lower pole renal mass was obviously seen. The gonadal vessels were identified and spared. The ureter was also identified. We then continued our dissection laterally, posterior to Gerota's fasica, until we could visualize the psoas muscle. We then moved cephalad to the renal hilum, and identified the following renal vessels:  Renal Vein: 1 and Renal Artery: 1.  These were carefully dissected and freed from surrounding strctures. Notably, we did have to insert another port inferior and lateral to the most inferior port to use a fourth arm to aid with assistance.     We then proceeded to divided Gerota's fascia in a clamshell fashion to reveal the mass, which would eventually be placed into an Endocatch bag. Next, we used an ultrasound probe to confirm the extent of the mass and plan our excision margins, which were then marked on the capsule using electrocautery. We also confirmed with ultrasound no other suspicious masses were seen - there were multiple cysts that appeared simple on ultrasound which was consistent with MRI findings.    We then proceeded to vessel loop the artery proximal to its branch point, and also inserted two bulldog clamps in anticipation for potential clamping - however we  were first going to attempt enucleation off clamp to preserve renal function. We began and completed enucleation with issue using intermittent cautery to prevent bleeding. We did identify one vessel during this process which we clipped - this appeared to stop all flow to the tumor. We finished enucleation off clamp without issue with minimal bleeding, and placed the specimen in the endocatch bag.     We then proceeded with renorrhaphy, using stratafix sutures to re-approximate the parenchyma, followed by bolsters of rolled Surgicell over hemostatic foam, held in place by stratafix sutures. The edges of Gerota's fasica and the overlying visceral fat were reapproximated using stratafix.  The bolster stitches were placed with minimal tension, just enough to hold the bolster in place.    A drain was NOT placed.     The robot was then undocked. The mass was then delivered through our assistant port and sent for permanent pathology.     The incisions were then irrigated. The RLQ incision was closed with figure of 8 0- vicryl for the fascia. The skin incisions were closed with 4-0 Monocryl. The wounds were dressed with Dermabond. The patient was then awakened and taken to the PACU in stable condition.    The patient tolerated the procedure well and there were no apparent complications. The patient  was transported to the postanesthesia care unit in stable condition.     Blaine Figueroa MD  Urology Resident       Attestation:  I was present for the entire procedure on 3/13/23.  YU Mijares MD

## 2023-03-13 NOTE — ANESTHESIA POSTPROCEDURE EVALUATION
Patient: Falguni Bennett    Procedure: Procedure(s):  NEPHRECTOMY, PARTIAL, ROBOT-ASSISTED, LAPAROSCOPIC - RIGHT, Ultrasound       Anesthesia Type:  General    Note:  Disposition: Outpatient   Postop Pain Control: Uneventful            Sign Out: Well controlled pain   PONV: No   Neuro/Psych: Uneventful            Sign Out: Acceptable/Baseline neuro status   Airway/Respiratory: Uneventful            Sign Out: Acceptable/Baseline resp. status   CV/Hemodynamics: Uneventful            Sign Out: Acceptable CV status; No obvious hypovolemia; No obvious fluid overload   Other NRE: NONE   DID A NON-ROUTINE EVENT OCCUR? No           Last vitals:  Vitals Value Taken Time   /75 03/13/23 1345   Temp 34.9  C (94.8  F) 03/13/23 1345   Pulse 72 03/13/23 1400   Resp 17 03/13/23 1400   SpO2 93 % 03/13/23 1400   Vitals shown include unvalidated device data.    Electronically Signed By: Peter Barajas MD, MD  March 13, 2023  2:01 PM

## 2023-03-14 VITALS
HEART RATE: 77 BPM | SYSTOLIC BLOOD PRESSURE: 132 MMHG | TEMPERATURE: 98.1 F | RESPIRATION RATE: 16 BRPM | WEIGHT: 173.1 LBS | BODY MASS INDEX: 29.55 KG/M2 | DIASTOLIC BLOOD PRESSURE: 69 MMHG | OXYGEN SATURATION: 99 % | HEIGHT: 64 IN

## 2023-03-14 LAB
ANION GAP SERPL CALCULATED.3IONS-SCNC: 9 MMOL/L (ref 7–15)
BUN SERPL-MCNC: 28.4 MG/DL (ref 8–23)
CALCIUM SERPL-MCNC: 9.4 MG/DL (ref 8.8–10.2)
CHLORIDE SERPL-SCNC: 110 MMOL/L (ref 98–107)
CREAT SERPL-MCNC: 2.16 MG/DL (ref 0.51–0.95)
DEPRECATED HCO3 PLAS-SCNC: 19 MMOL/L (ref 22–29)
ERYTHROCYTE [DISTWIDTH] IN BLOOD BY AUTOMATED COUNT: 13.2 % (ref 10–15)
GFR SERPL CREATININE-BSD FRML MDRD: 24 ML/MIN/1.73M2
GLUCOSE BLDC GLUCOMTR-MCNC: 84 MG/DL (ref 70–99)
GLUCOSE SERPL-MCNC: 88 MG/DL (ref 70–99)
HCT VFR BLD AUTO: 34.1 % (ref 35–47)
HGB BLD-MCNC: 10.2 G/DL (ref 11.7–15.7)
HGB BLD-MCNC: 10.4 G/DL (ref 11.7–15.7)
MCH RBC QN AUTO: 30.1 PG (ref 26.5–33)
MCHC RBC AUTO-ENTMCNC: 30.5 G/DL (ref 31.5–36.5)
MCV RBC AUTO: 99 FL (ref 78–100)
PLATELET # BLD AUTO: 150 10E3/UL (ref 150–450)
POTASSIUM SERPL-SCNC: 4.6 MMOL/L (ref 3.4–5.3)
RBC # BLD AUTO: 3.45 10E6/UL (ref 3.8–5.2)
SODIUM SERPL-SCNC: 138 MMOL/L (ref 136–145)
WBC # BLD AUTO: 7.6 10E3/UL (ref 4–11)

## 2023-03-14 PROCEDURE — 85027 COMPLETE CBC AUTOMATED: CPT | Performed by: STUDENT IN AN ORGANIZED HEALTH CARE EDUCATION/TRAINING PROGRAM

## 2023-03-14 PROCEDURE — 85018 HEMOGLOBIN: CPT | Performed by: PHYSICIAN ASSISTANT

## 2023-03-14 PROCEDURE — 250N000013 HC RX MED GY IP 250 OP 250 PS 637: Performed by: STUDENT IN AN ORGANIZED HEALTH CARE EDUCATION/TRAINING PROGRAM

## 2023-03-14 PROCEDURE — 36415 COLL VENOUS BLD VENIPUNCTURE: CPT | Performed by: PHYSICIAN ASSISTANT

## 2023-03-14 PROCEDURE — 36415 COLL VENOUS BLD VENIPUNCTURE: CPT | Performed by: STUDENT IN AN ORGANIZED HEALTH CARE EDUCATION/TRAINING PROGRAM

## 2023-03-14 PROCEDURE — 80048 BASIC METABOLIC PNL TOTAL CA: CPT | Performed by: STUDENT IN AN ORGANIZED HEALTH CARE EDUCATION/TRAINING PROGRAM

## 2023-03-14 RX ORDER — ACETAMINOPHEN 325 MG/1
975 TABLET ORAL EVERY 4 HOURS PRN
Qty: 120 TABLET | Refills: 0 | Status: SHIPPED | OUTPATIENT
Start: 2023-03-14

## 2023-03-14 RX ORDER — AMOXICILLIN 250 MG
1-2 CAPSULE ORAL 2 TIMES DAILY
Qty: 45 TABLET | Refills: 0 | Status: SHIPPED | OUTPATIENT
Start: 2023-03-14

## 2023-03-14 RX ADMIN — OXYCODONE HYDROCHLORIDE 5 MG: 5 TABLET ORAL at 12:59

## 2023-03-14 RX ADMIN — ACETAMINOPHEN 975 MG: 325 TABLET ORAL at 06:37

## 2023-03-14 ASSESSMENT — ACTIVITIES OF DAILY LIVING (ADL)
ADLS_ACUITY_SCORE: 30
ADLS_ACUITY_SCORE: 28
ADLS_ACUITY_SCORE: 30

## 2023-03-14 NOTE — DISCHARGE SUMMARY
Pt. discharged at 1:00PM to home, was accompanied by , and left with personal belongings. Pt. received complete discharge paperwork and  medications as filled by discharge pharmacy. Pt. was given times of last dose for all discharge medications in writing on discharge medication sheets. Discharge teaching include medication, pain management, activity restrictions, and signs and symptoms of infection. Pt. to follow up with Primary provider in one week. Pt. had no further questions at the time of discharge and no unmet needs were identified.

## 2023-03-14 NOTE — DISCHARGE SUMMARY
[facility] UroDischarge Summary    Patient: Falguni Bennett    MRN: [id number]   : 1950         Date of Admission:  3/13/2023   Date of Discharge::  3/14/2023  Admitting Physician:  Urbano Mijares MD  Discharge Physician:  Magaly Claire PA-C             Admission Diagnoses:   Right renal mass [N28.89]  CKD 4 (present prior to admission)    Past Medical History:   Diagnosis Date     Anxiety      Chronic kidney disease      Dyslipidemia      HTN (hypertension)      Hyperparathyroidism (H)      Nephrolithiasis      Renal mass              Discharge Diagnosis:     Right renal mass [N28.89]  CKD 4 (present prior to admission)    Past Medical History:   Diagnosis Date     Anxiety      Chronic kidney disease      Dyslipidemia      HTN (hypertension)      Hyperparathyroidism (H)      Nephrolithiasis      Renal mass           Procedures:     Procedure(s): 3/13/23 -   PROCEDURES PERFORMED:   1. Right, Robot-assisted laparoscopic partial nephrectomy  Dr. Urbano Mijares (Urology)            Medications Prior to Admission:     Medications Prior to Admission   Medication Sig Dispense Refill Last Dose     atorvastatin (LIPITOR) 20 MG tablet Take 20 mg by mouth At Bedtime   3/12/2023     LORazepam (ATIVAN) 0.5 MG tablet Take 0.5 mg by mouth every 6 hours as needed for anxiety   Past Week     metoprolol succinate ER (TOPROL XL) 50 MG 24 hr tablet Take 50 mg by mouth At Bedtime   3/12/2023             Discharge Medications:     Current Discharge Medication List      START taking these medications    Details   acetaminophen (TYLENOL) 325 MG tablet Take 3 tablets (975 mg) by mouth every 4 hours as needed for pain (Do not exceed 3,200mg in 24 hours)  Qty: 120 tablet, Refills: 0    Associated Diagnoses: Post-operative state      senna-docusate (SENOKOT-S/PERICOLACE) 8.6-50 MG tablet Take 1-2 tablets by mouth 2 times daily To prevent/ treat constipation  Qty: 45 tablet, Refills: 0    Associated Diagnoses:  Post-operative state         CONTINUE these medications which have NOT CHANGED    Details   atorvastatin (LIPITOR) 20 MG tablet Take 20 mg by mouth At Bedtime      LORazepam (ATIVAN) 0.5 MG tablet Take 0.5 mg by mouth every 6 hours as needed for anxiety      metoprolol succinate ER (TOPROL XL) 50 MG 24 hr tablet Take 50 mg by mouth At Bedtime                   Consultations:   Consultation during this admission received:   None          Brief History of Illness:   Reason for admission requiring a surgical or invasive procedure:   Right renal mass [N28.89]   The patient underwent the following procedure(s):   See above   There were no immediate complications during this procedure.    Please refer to the full operative summary for details.           Hospital Course:   The patient's hospital course was unremarkable.  Falguni Bennett recovered as anticipated and experienced no post-operative complications.      On POD #1 she was ambulating without assitance, tolerating the discharge diet, had pain controlled with PO medications to go home with, and was requiring no IV medications or fluids. Her Rodriguez was removed and she voided without difficulty.  She was discharged to home with appropriate contact information, follow-up and instructions as seen below in the discharge paperwork.         Discharge Labs:     No results found for: PSA  Recent Labs   Lab 03/14/23  1033 03/14/23  0646   WBC  --  7.6   HGB 10.2* 10.4*   PLT  --  150     Recent Labs   Lab 03/14/23  0646 03/14/23  0640 03/13/23  0650     --  142   POTASSIUM 4.6  --  4.2   CHLORIDE 110*  --  108*   CO2 19*  --  23   BUN 28.4*  --  31.8*   CR 2.16*  --  2.24*   GLC 88 84 107*   KAT 9.4  --  11.2*     No lab results found in last 7 days.    Invalid input(s): URINEBLOOD  No results found for this or any previous visit.         Discharge Instructions and Follow-Up:   Diet:  - Regular/ home diet    Activity:   - No strenuous exercise for 4 weeks.   - No  lifting, pushing, pulling more than 15 pounds for 4 weeks.   - Do not strain with bowel movements.   - Do not drive until you can press the brake pedal quickly and fully without pain.   - Do not operate a motor vehicle while taking narcotic pain medications.     Medications:   - PAIN: Continue home Tylenol (acetaminophen 625mg) for postoperative.  Stephania has been prescribed for you.  Do not take more than 3,200mg of Tylenol (acetaminophen/ APAP) from all sources in any 24 hour period since this can cause liver damage.      2) CONSTIPATION: Pericolace (senna/docusate sodium) can be taken twice daily for prevention of constipation since surgery, pain medications and bladder spasm medications can all make you constipated.  Please reduce or stop pericolace if you develop loose stools. Other over the counter solutions such as prune juice, miralax, fiber products, senna, and dulcolax can also be used. If you are taking the pericolace but still have not had a bowel movement in 3 days, start over-the-counter Milk of Magnesia taken twice daily until you have a good result.  Call the office with any concerns.     Incisions:   - You may shower and get incisions wet starting 48 hrs after surgery.  - Do not scrub incisions or submerge wounds in a bath, pool, hot tub, etc. for 2 weeks or until incisions have fully healed (whichever is longer).    - Your incisions were closed with dissolvable suture that will not need to be removed.  Commonly, purple dermabond glue is applied over the top of the sutures.  Avoid using lotions or ointments on your incisions.    - Leave incisions open to air.  Cover with gauze only if needed for comfort or to protect clothing from drainage.     Follow-Up:  - Schedule an appointment to be seen by your primary care provider within 7-10 days of discharge for a postoperative checkup  - Follow up with Dr. Mijares as scheduled on 3/29/23 to review pathology and for a postoperative checkup  - Call or return  "sooner than your regularly scheduled visit if you develop any of the following:  Fever (greater than 101.3F) or flu-like symptoms, uncontrolled pain, uncontrolled nausea or vomiting, as well as increased redness, swelling, or drainage from your wound or any difficulties passing urine.   - Drink 2-3L of water a day to keep your urine clear to light pink in color. Some light blood in your urine can occur but should clear with increased water consumption as instructed.    - Call if blood in urine persists, becomes darker, you see clots or have difficulty urinating    Phone numbers:   - Monday through Friday 8am to 4:30pm: Call [phone] with questions, requests for medication refills, or to schedule or confirm an appointment.  - Nights, weekends, or holidays: call the after hours emergency pager - [phone] and tell the  \"I would like to page the Urology Resident on call.\" Typically, the on-call provider should return your call within 30 minutes.  Please page the on-call provider again if you haven't been contacted as expected.  Rarely, the on-call provider will be unable to promptly return a call due to a hospital emergency.  If you have paged twice and are still not contacted, ask the hospital  to page the \"urology CHIEF-RESIDENT on call\".   - Please note that due to prescribing laws, resident physicians are unable to prescribe narcotics after-hours. If you feel as though you will need a refill of a narcotic pain medication, you will need to call the clinic during business hours OR seek emergency care.  - For emergencies, call 911         Discharge Disposition:     Discharged to home      Attestation: I have reviewed today's vital signs, notes, medications, labs and imaging.    Dianna Claire PA-C  Urology Physician Assistant  Personal Pager: [phone]    Please call Job Code:   [phone] to reach the Urology resident or PA on call - Weekdays  [phone] to reach the Urology resident or PA on call - " Weeknights and weekends    March 14, 2023

## 2023-03-14 NOTE — PLAN OF CARE
Admitted/transferred from: PACU  2 RN full   skin assessment completed by Марина Mercer, RN and Jihan Cardenas, RN .  Skin assessment finding: issues found 5 lap sites covered with glue, scab under R breast, Rodriguez in place, R&L PIV.   Interventions/actions: skin interventions Mepileix applied on coccyx , continue to monitor    Bedside Emergency Equipment Present:  Suction Regulator: Yes  Suction Canister: Yes  Tubing between Regulator and Canister: Yes  O2 Regulator with Tree: Yes  Ambu Bag: Yes     Time: 9521-9964  Reason for admission: s/p Nephrectomy   Activity: Assist of 1, Not OOB yet  Pain\Nausea: Maintained with scheduled tylenol.  C/o N/V maintained with zofran.   Neuro: A&O x4, calm and cooperative.   Cardiac: WDL, denies chest pain   Respiratory: On 2L NC, denies SOB   GI/: Rodriguez-AUOP, no BM this shift, -ve flatus, hypoactive BS   Diet: Clear liquid-tolerating  Lines:  R PIV infusing NS @ 100 ml/hr, L PIV SL.   Incisions/Drains/Skin: See skin note    Lab:  Reviewed       Plan: Continue with POC.

## 2023-03-14 NOTE — PHARMACY-ADMISSION MEDICATION HISTORY
Admission Medication History Completed by Pharmacy    See [facility] Admission Navigator for allergy information, preferred outpatient pharmacy, prior to admission medications and immunization status.     Medication History Sources:     Patient, Surescripts    Changes made to PTA medication list (reason):    Added: None    Deleted: aspirin- pt denies taking    Changed: None    Additional Information:    Patient is a good historian.     Prior to Admission medications    Medication Sig Last Dose Taking? Auth Provider Long Term End Date   atorvastatin (LIPITOR) 20 MG tablet Take 20 mg by mouth At Bedtime 3/12/2023 Yes Reported, Patient Yes    LORazepam (ATIVAN) 0.5 MG tablet Take 0.5 mg by mouth every 6 hours as needed for anxiety Past Week Yes Reported, Patient     metoprolol succinate ER (TOPROL XL) 50 MG 24 hr tablet Take 50 mg by mouth At Bedtime 3/12/2023 Yes Reported, Patient Yes        Date completed: 03/14/23    Medication history completed by: Jacy Bob [facility]

## 2023-03-14 NOTE — PROGRESS NOTES
"Urology  Progress Note    AFVNS  NAEON  Emesis 1x o/n; now controlled.   Tolerating clears  Pain controlled     Exam  /75   Pulse 65   Temp 97.8  F (36.6  C) (Axillary)   Resp 14   Ht 1.626 m (5' 4\")   Wt 76.2 kg (167 lb 15.9 oz)   SpO2 99%   BMI 28.84 kg/m    No acute distress  Unlabored breathing  Abdomen soft, nontender, nondistended. Incisions cdi  Jaffe with clear yellow urine in tubing    /1150    Labs  Recent Labs   Lab Test 03/13/23  0650 02/15/23  0949 12/30/22  1754   WBC  --  5.4 9.5   HGB  --  13.4 13.4   CR 2.24* 2.15* 2.19*      AM labs pending    Assessment/Plan  72 year old y/o female POD#1 s/p partial nephrectomy for renal mass presumed urothelial carcinoma.     Neuro: APAP, oxycodone, HM for pain control  CV:   Pulm: incentive spirometry while awake  FEN/GI: Regular diet , MIVF 100cc/hr  Endo: SSI  : jaffe cath to be removed today  Heme/ID: monitor for leukocytosis and fever  Activity: OOB  PPx: SCDs  Dispo: home if tolerating regular diet, labs WNL, and ambulating     Seen and examined with the chief resident. Will discuss with Dr. Mijares.    Benji Orta MD, PGY-1  Urology Resident     Contacting the Urology Team     Please use the following job codes to reach the Urology Team. Note that you must use an in house phone and that job codes cannot receive text pages.   On weekdays, dial [phone] (or star-star-star [phone] on the new [employer] telephones) then [phone] to reach the Adult Urology resident or PA on call  On weekdays, dial [phone] (or star-star-star [phone] on the new [employer] telephones) then [phone] to reach the Pediatric Urology resident  On weeknights and weekends, dial [phone] (or star-star-star [phone] on the new [employer] telephones) then [phone] to reach the Urology resident on call (for both Adult and Pediatrics)              "

## 2023-03-15 ENCOUNTER — PATIENT OUTREACH (OUTPATIENT)
Dept: CARE COORDINATION | Facility: CLINIC | Age: 73
End: 2023-03-15
Payer: COMMERCIAL

## 2023-03-15 NOTE — PROGRESS NOTES
Clinic Care Coordination Contact  [facility]: Post-Discharge Note  SITUATION                                                      Admission:    Admission Date: 03/13/23   Reason for Admission: Right renal mass  Discharge:   Discharge Date: 03/14/23  Discharge Diagnosis: Right renal mass    BACKGROUND                                                      The patient's hospital course was unremarkable.  Falguni Bennett recovered as anticipated and experienced no post-operative complications.       On POD #1 she was ambulating without assitance, tolerating the discharge diet, had pain controlled with PO medications to go home with, and was requiring no IV medications or fluids. Her Rodriguez was removed and she voided without difficulty.  She was discharged to home with appropriate contact information, follow-up and instructions as seen below in the discharge paperwork.       ASSESSMENT           Discharge Assessment  How are you doing now that you are home?: Patient shares that she is doing well. No questions, concerns, or needs at this time.  How are your symptoms? (Red Flag symptoms escalate to triage hotline per guidelines): Improved  Do you feel your condition is stable enough to be safe at home until your provider visit?: Yes  Does the patient have their discharge instructions? : Yes  Does the patient have questions regarding their discharge instructions? : No  Were you started on any new medications or were there changes to any of your previous medications? : Yes  Does the patient have all of their medications?: Yes  Do you have questions regarding any of your medications? : No  Do you have all of your needed medical supplies or equipment (DME)?  (i.e. oxygen tank, CPAP, cane, etc.): Yes  Discharge follow-up appointment scheduled within 14 calendar days? : Yes  Discharge Follow Up Appointment Date: 03/29/23  Discharge Follow Up Appointment Scheduled with?: Specialty Care Provider    Post-op ([facility] Only)  If  the patient had a surgery or procedure, do they have any questions for a nurse?: No    Post-op (Clinicians Only)  Did the patient have surgery or a procedure: No  Fever: No  Chills: No        PLAN                                                      Outpatient Plan:  - Schedule an appointment to be seen by your primary care provider within 7-10 days of discharge for a postoperative checkup  - Follow up with Dr. Mijares as scheduled on 3/29/23 to review pathology and for a postoperative checkup  - Call or return sooner than your regularly scheduled visit if you develop any of the following:  Fever (greater than 101.3F) or flu-like symptoms, uncontrolled pain, uncontrolled nausea or vomiting, as well as increased redness, swelling, or drainage from your wound or any difficulties passing urine.   - Drink 2-3L of water a day to keep your urine clear to light pink in color. Some light blood in your urine can occur but should clear with increased water consumption as instructed.    - Call if blood in urine persists, becomes darker, you see clots or have difficulty urinating    Future Appointments   Date Time Provider Department Center   3/29/2023  2:20 PM Urbano Mijares MD [facility]         For any urgent concerns, please contact our 24 hour nurse triage line: [phone] ([phone])         NIYAH Santacruz

## 2023-03-17 LAB
PATH REPORT.COMMENTS IMP SPEC: ABNORMAL
PATH REPORT.COMMENTS IMP SPEC: ABNORMAL
PATH REPORT.COMMENTS IMP SPEC: YES
PATH REPORT.FINAL DX SPEC: ABNORMAL
PATH REPORT.GROSS SPEC: ABNORMAL
PATH REPORT.MICROSCOPIC SPEC OTHER STN: ABNORMAL
PATH REPORT.RELEVANT HX SPEC: ABNORMAL
PATHOLOGY SYNOPTIC REPORT: ABNORMAL
PHOTO IMAGE: ABNORMAL

## 2023-03-19 NOTE — RESULT ENCOUNTER NOTE
Ms. Bennett,   The pathology report does show that your mass was kidney cancer but it also shows that all of the mass was removed.  I hope your recovery is going well and I will see you at your upcoming follow-up visit.  Thank you for choosing [facility] for your care.      YU Mijares MD

## 2023-03-29 ENCOUNTER — VIRTUAL VISIT (OUTPATIENT)
Dept: UROLOGY | Facility: CLINIC | Age: 73
End: 2023-03-29
Payer: COMMERCIAL

## 2023-03-29 DIAGNOSIS — Z53.9 ERRONEOUS ENCOUNTER--DISREGARD: Primary | ICD-10-CM

## 2023-03-29 NOTE — PROGRESS NOTES
"Virtual Visit Details    Type of service:  Video Visit   Video Start Time: {video visit start/end time for provider to select:333473}  Video End Time:{video visit start/end time for provider to select:959993}    Originating Location (pt. Location): {video visit patient location:953116::\"Home\"}  {PROVIDER LOCATION On-site should be selected for visits conducted from your clinic location or adjoining [facility] hospital, academic office, or other nearby [facility] building. Off-site should be selected for all other provider locations, including home:215846}  Distant Location (provider location):  {virtual location provider:071683}  Platform used for Video Visit: {Virtual Visit Platforms:837389::\"[employer]\"}          "

## 2023-03-29 NOTE — LETTER
3/29/2023       RE: Falguni Bennett  [id number]  [facility] [address]     Dear Colleague,    Thank you for referring your patient, Falguni Bennett, to the [facility] UROLOGY [facility]IN[facility]. Please see a copy of my visit note below.      This encounter was opened in error. Please disregard.      Again, thank you for allowing me to participate in the care of your patient.      Sincerely,    Urbano Mijares MD

## 2023-03-29 NOTE — NURSING NOTE
Is the patient currently in the state of [place]? YES    Visit mode:VIDEO    If the visit is dropped, the patient can be reconnected by: VIDEO VISIT: Text to cell phone: [phone]    Will anyone else be joining the visit? NO      How would you like to obtain your AVS? MyChart    Are changes needed to the allergy or medication list? NO    Reason for visit:   Chief Complaint   Patient presents with     Video Visit     2 weeks post op, DOS 3/13/23

## 2023-03-30 ENCOUNTER — TELEPHONE (OUTPATIENT)
Dept: UROLOGY | Facility: CLINIC | Age: 73
End: 2023-03-30
Payer: COMMERCIAL

## 2023-03-30 NOTE — CONFIDENTIAL NOTE
Spoke with patient and rescheduled appointment from 3/29 to 4/21.  Patient understood appointment details.    Minor Johnson, RN  RN Care Coordinator - Urology

## 2023-04-21 ENCOUNTER — VIRTUAL VISIT (OUTPATIENT)
Dept: UROLOGY | Facility: CLINIC | Age: 73
End: 2023-04-21
Payer: COMMERCIAL

## 2023-04-21 DIAGNOSIS — C64.1 MALIGNANT NEOPLASM OF KIDNEY EXCLUDING RENAL PELVIS, RIGHT (H): Primary | ICD-10-CM

## 2023-04-21 PROCEDURE — 99024 POSTOP FOLLOW-UP VISIT: CPT | Mod: VID | Performed by: UROLOGY

## 2023-04-21 NOTE — LETTER
4/21/2023       RE: Falguni Bennett  [address]  [place] [address]     Dear Colleague,    Thank you for referring your patient, Falguni Bennett, to the [facility] UROLOGY CLINIC [place] at [facility]. Please see a copy of my visit note below.      Urology Clinic  YU Mijares MD    Apr 21, 2023  72 year old female    ASSESSMENT AND PLAN    Kidney stones vs Nephrocalcinosis  4/18/11 Left parathyroidectomy.  Dr Smith.  Done for hyperparathyroidism.  12/30/22 CT shows multiple bilateral stones vs nephrocalcinosis.  These are more or less stable since 2010.    Right lower pole anterior renal mass.  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  Dr. Bharath Mijares, [facility].  Papillary renal cell carcinoma.  Gr 2.  5.2cm.  Negative margins.    Possible right upper pole mass  12/30/22 CT      Renal cysts  1/13/23 MRI shows multiple bilateral renal cysts.  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  GFR in low 20s.      Plan  Return to clinic in 6 months with CT Abdomen/Pelvis with and without contrast.    _______________________________________________________________________    HPI   She presented to the ER on 12/30/22 with lower abdominal pain.  On work-up for this a right renal mass was found.  This was a new finding for her.  She has a prominent history of kidney stones and has undergone parathyroidectomy in 2011.    The abdominal pain was found to be diverticulitis and this resolved with antibiotic.    Her grandmother and daughter had kidney stones but no other history of renal cell carcinoma.    She denies any flank pain since 2011 4/21/23  She is doing well since surgery      1/13/23 MRI Abdomen with and without contrast   I reviewed the radiologic images and report from  this radiologic exam.  My independent interpretation is:  Right lower pole renal mass 4.7cm  Numerous bilateral cysts      12/30/22 CT Abdomen/Pelvis with and without contrast   I reviewed the radiologic images and report from this radiologic exam.  My independent interpretation is:  4.9cm right lower pole renal mass.        I reviewed the following laboratory data and went over findings with patient:  Recent Labs   Lab Test 12/30/22  1754   WBC 9.5   HGB 13.4        Recent Labs   Lab Test 12/30/22  1754   CR 2.19*   GFRESTIMATED 24*   *         Video-Visit Details  Video Start Time: 1220  Video End Time: 1223  Time spent on pre-visit work, post visit work, and documentation on day of visit outside of time during video visit: 1 min  Total time on the day of the visit: 4 min  Originating Location (pt. Location): Home.    Distant Location (provider location):  [facility].  Platform used for Video Visit: [employer]              Patient Care Team:  Aamir Cook MD as PCP - Urbano Coleman MD as Assigned Surgical Provider      Copy to patient  MIKY GAMBLE  [id number]  [place] [id number]      Again, thank you for allowing me to participate in the care of your patient.      Sincerely,    Urbano Mijares MD

## 2023-04-21 NOTE — PROGRESS NOTES
Urology Clinic  YU Mijares MD    Apr 21, 2023  72 year old female    ASSESSMENT AND PLAN    Kidney stones vs Nephrocalcinosis    4/18/11 Left parathyroidectomy.  Dr Smith.  Done for hyperparathyroidism.    12/30/22 CT shows multiple bilateral stones vs nephrocalcinosis.  These are more or less stable since 2010.    Right lower pole anterior renal mass.    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  Dr. Bharath Mijares, [facility].  Papillary renal cell carcinoma.  Gr 2.  5.2cm.  Negative margins.    Possible right upper pole mass    12/30/22 CT      Renal cysts    1/13/23 MRI shows multiple bilateral renal cysts.  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  GFR in low 20s.      Plan    Return to clinic in 6 months with CT Abdomen/Pelvis with and without contrast.    _______________________________________________________________________    HPI   She presented to the ER on 12/30/22 with lower abdominal pain.  On work-up for this a right renal mass was found.  This was a new finding for her.  She has a prominent history of kidney stones and has undergone parathyroidectomy in 2011.    The abdominal pain was found to be diverticulitis and this resolved with antibiotic.    Her grandmother and daughter had kidney stones but no other history of renal cell carcinoma.    She denies any flank pain since 2011 4/21/23  She is doing well since surgery      1/13/23 MRI Abdomen with and without contrast   I reviewed the radiologic images and report from this radiologic exam.  My independent interpretation is:    Right lower pole renal mass 4.7cm    Numerous bilateral cysts      12/30/22 CT Abdomen/Pelvis with and without contrast   I reviewed the radiologic images and report from this radiologic exam.  My independent interpretation is:    4.9cm  right lower pole renal mass.          I reviewed the following laboratory data and went over findings with patient:  Recent Labs   Lab Test 12/30/22  1754   WBC 9.5   HGB 13.4        Recent Labs   Lab Test 12/30/22  1754   CR 2.19*   GFRESTIMATED 24*   *         Video-Visit Details  Video Start Time: 1220  Video End Time: 1223  Time spent on pre-visit work, post visit work, and documentation on day of visit outside of time during video visit: 1 min  Total time on the day of the visit: 4 min  Originating Location (pt. Location): Home.    Distant Location (provider location):  [facility].  Platform used for Video Visit: [employer]  Patient Care Team:  Aamir Cook MD as PCP - Urbano Coleman MD as Assigned Surgical Provider      Copy to patient  MIKY GAMBLE  [id number]  [place] [address]

## 2023-04-21 NOTE — PROGRESS NOTES
"Virtual Visit Details    Type of service:  Video Visit   Video Start Time: {video visit start/end time for provider to select:096624}  Video End Time:{video visit start/end time for provider to select:868517}    Originating Location (pt. Location): {video visit patient location:640626::\"Home\"}  {PROVIDER LOCATION On-site should be selected for visits conducted from your clinic location or adjoining [facility] hospital, academic office, or other nearby [facility] building. Off-site should be selected for all other provider locations, including home:834784}  Distant Location (provider location):  {virtual location provider:530038}  Platform used for Video Visit: {Virtual Visit Platforms:340536::\"[employer]Mobile\"}  "

## 2023-04-21 NOTE — NURSING NOTE
Is the patient currently in the state of [place]? YES    Visit mode:VIDEO    If the visit is dropped, the patient can be reconnected by: VIDEO VISIT: Text to cell phone: [phone]    Will anyone else be joining the visit? NO      How would you like to obtain your AVS? MyChart    Are changes needed to the allergy or medication list? NO    Reason for visit: Video Visit (Post op follow-up, DOS 3/29)

## 2023-05-20 ENCOUNTER — HEALTH MAINTENANCE LETTER (OUTPATIENT)
Age: 73
End: 2023-05-20

## 2023-08-29 ENCOUNTER — HOSPITAL ENCOUNTER (EMERGENCY)
Facility: CLINIC | Age: 73
Discharge: HOME | End: 2023-08-29
Attending: FAMILY MEDICINE | Admitting: FAMILY MEDICINE
Payer: COMMERCIAL

## 2023-08-29 VITALS
HEART RATE: 76 BPM | TEMPERATURE: 98.1 F | WEIGHT: 165 LBS | DIASTOLIC BLOOD PRESSURE: 88 MMHG | SYSTOLIC BLOOD PRESSURE: 158 MMHG | OXYGEN SATURATION: 98 % | HEIGHT: 64 IN | BODY MASS INDEX: 28.17 KG/M2 | RESPIRATION RATE: 18 BRPM

## 2023-08-29 DIAGNOSIS — R10.32 LLQ ABDOMINAL PAIN: ICD-10-CM

## 2023-08-29 LAB
ANION GAP SERPL CALCULATED.3IONS-SCNC: 10 MMOL/L (ref 7–15)
BASOPHILS # BLD AUTO: 0 10E3/UL (ref 0–0.2)
BASOPHILS NFR BLD AUTO: 0 %
BUN SERPL-MCNC: 45 MG/DL (ref 8–23)
CALCIUM SERPL-MCNC: 12.9 MG/DL (ref 8.8–10.2)
CHLORIDE SERPL-SCNC: 107 MMOL/L (ref 98–107)
CREAT SERPL-MCNC: 2.43 MG/DL (ref 0.51–0.95)
DEPRECATED HCO3 PLAS-SCNC: 23 MMOL/L (ref 22–29)
EOSINOPHIL # BLD AUTO: 0.2 10E3/UL (ref 0–0.7)
EOSINOPHIL NFR BLD AUTO: 2 %
ERYTHROCYTE [DISTWIDTH] IN BLOOD BY AUTOMATED COUNT: 12.7 % (ref 10–15)
GFR SERPL CREATININE-BSD FRML MDRD: 21 ML/MIN/1.73M2
GLUCOSE SERPL-MCNC: 109 MG/DL (ref 70–99)
HCT VFR BLD AUTO: 37 % (ref 35–47)
HGB BLD-MCNC: 12.1 G/DL (ref 11.7–15.7)
IMM GRANULOCYTES # BLD: 0 10E3/UL
IMM GRANULOCYTES NFR BLD: 0 %
LYMPHOCYTES # BLD AUTO: 1.7 10E3/UL (ref 0.8–5.3)
LYMPHOCYTES NFR BLD AUTO: 22 %
MCH RBC QN AUTO: 31.4 PG (ref 26.5–33)
MCHC RBC AUTO-ENTMCNC: 32.7 G/DL (ref 31.5–36.5)
MCV RBC AUTO: 96 FL (ref 78–100)
MONOCYTES # BLD AUTO: 0.8 10E3/UL (ref 0–1.3)
MONOCYTES NFR BLD AUTO: 11 %
NEUTROPHILS # BLD AUTO: 5.2 10E3/UL (ref 1.6–8.3)
NEUTROPHILS NFR BLD AUTO: 65 %
NRBC # BLD AUTO: 0 10E3/UL
NRBC BLD AUTO-RTO: 0 /100
PLATELET # BLD AUTO: 190 10E3/UL (ref 150–450)
POTASSIUM SERPL-SCNC: 4.7 MMOL/L (ref 3.4–5.3)
RBC # BLD AUTO: 3.85 10E6/UL (ref 3.8–5.2)
SODIUM SERPL-SCNC: 140 MMOL/L (ref 136–145)
WBC # BLD AUTO: 7.9 10E3/UL (ref 4–11)

## 2023-08-29 PROCEDURE — 99284 EMERGENCY DEPT VISIT MOD MDM: CPT | Performed by: FAMILY MEDICINE

## 2023-08-29 PROCEDURE — 80048 BASIC METABOLIC PNL TOTAL CA: CPT | Performed by: FAMILY MEDICINE

## 2023-08-29 PROCEDURE — 99283 EMERGENCY DEPT VISIT LOW MDM: CPT | Performed by: FAMILY MEDICINE

## 2023-08-29 PROCEDURE — 36415 COLL VENOUS BLD VENIPUNCTURE: CPT | Performed by: FAMILY MEDICINE

## 2023-08-29 PROCEDURE — 85025 COMPLETE CBC W/AUTO DIFF WBC: CPT | Performed by: FAMILY MEDICINE

## 2023-08-29 NOTE — DISCHARGE INSTRUCTIONS
RETURN TO THE EMERGENCY ROOM FOR THE FOLLOWING:    Severely worsened pain, repeated vomiting and dehydration, fever greater than 101, or at anytime for any concern.    FOLLOW UP:    With your primary clinic only as needed.  Return to the ED if not improved over the next 5 days.    TREATMENT RECOMMENDATIONS:    Tylenol for pain as needed.    Consider Augmentin twice daily over the next 7 days for diverticulitis, as discussed.  There is an option to hold onto the prescription and wait to see if your pain improves over the next 48 hours.  If it does not improve you may consider using the antibiotic.    NURSE ADVICE LINE:  [phone] or [phone]

## 2023-08-29 NOTE — ED TRIAGE NOTES
Pt presents with 3 days of abd pain, LLQ.  H/o diverticulitis and states it feels the same.      Triage Assessment       Row Name 08/29/23 5736       Triage Assessment (Adult)    Airway WDL WDL       Cognitive/Neuro/Behavioral WDL    Cognitive/Neuro/Behavioral WDL WDL

## 2023-08-29 NOTE — ED PROVIDER NOTES
"  HPI   Patient is a 73-year-old female presenting with left lower quadrant abdominal pain.  She was diagnosed with diverticulitis in December, 2022.  She was given oral antibiotics after confirmation of diverticulitis on CT scan.  This was her first diagnoses of diverticulitis.  There was no complication and she did not require hospitalization or surgical follow-up.  She has a known history of CKD stage IV.  She has hypertension and hyperlipidemia, taking Lipitor and metoprolol.  She has hyperparathyroidism and hypercalcemia.  Known history of kidney cancer with renal removal this past spring.  She did not receive chemotherapy or radiation and does not take medication for immunosuppression.  She is not diabetic.    The patient has had some mild constipation over the past 3 to 4 days.  She has some chills.  She has some new left lower quadrant pain starting yesterday.  The pain is constant.  It is worse with movement.  She says, \"this feels exactly like my diverticulitis did last winter.\"  She has had some transient nausea but no vomiting.  No hematochezia or melena.  No objective fever.  No back or flank pain.  No dysuria, urgency, or frequency of urination.      Allergies:  Allergies   Allergen Reactions    Morphine [Morphine Sulfate-Nacl] Nausea and Vomiting    Cipro [Ciprofloxacin Hydrochloride]      Muscle weakness    Nuts      (Filberts) Throat swells     Problem List:    Patient Active Problem List    Diagnosis Date Noted    Malignant neoplasm of kidney excluding renal pelvis, right (H) 04/21/2023     Priority: Medium    Post-operative state 03/13/2023     Priority: Medium      Past Medical History:    Past Medical History:   Diagnosis Date    Anxiety     Chronic kidney disease     Dyslipidemia     HTN (hypertension)     Hyperparathyroidism (H)     Nephrolithiasis     Renal mass      Past Surgical History:    Past Surgical History:   Procedure Laterality Date    ANKLE SURGERY Right 2019    schwannoma removed " "from ankle    DAVINCI NEPHRECTOMY PARTIAL Right 3/13/2023    Procedure: NEPHRECTOMY, PARTIAL, ROBOT-ASSISTED, LAPAROSCOPIC - RIGHT, Ultrasound;  Surgeon: Urbano Mijares MD;  Location: [facility] OR    EXPLORE NECK  2011    Procedure:EXPLORE NECK; Surgeon:LUIS ADAM; Location:[facility] OR    EXTRACORPOREAL SHOCK WAVE LITHOTRIPSY (ESWL)      PARATHYROIDECTOMY  2011    Procedure:PARATHYROIDECTOMY; Left inferior Parathyroidectomy; Surgeon:LUIS ADAM; Location:[facility] OR    TUBAL LIGATION       Family History:    Family History   Problem Relation Age of Onset    Ovarian Cancer Mother     Hypertension Mother     Lung Cancer Brother     Arthritis Maternal Grandmother     Cerebrovascular Disease Maternal Grandmother     Diabetes Paternal Grandmother     Cancer Maternal Aunt     Anesthesia Reaction No family hx of     Clotting Disorder No family hx of      Social History:  Marital Status:   [2]  Social History     Tobacco Use    Smoking status: Former     Packs/day: 0.50     Types: Cigarettes     Quit date: 2011     Years since quittin.4   Substance Use Topics    Alcohol use: Yes     Comment: Rare    Drug use: Yes     Types: Marijuana     Comment: Once in a while      Medications:    amoxicillin-clavulanate (AUGMENTIN) 875-125 MG tablet  acetaminophen (TYLENOL) 325 MG tablet  atorvastatin (LIPITOR) 20 MG tablet  LORazepam (ATIVAN) 0.5 MG tablet  metoprolol succinate ER (TOPROL XL) 50 MG 24 hr tablet  senna-docusate (SENOKOT-S/PERICOLACE) 8.6-50 MG tablet      Review of Systems   All other systems reviewed and are negative.      PE   BP: (!) 162/91  Pulse: 76  Temp: 98.1  F (36.7  C)  Resp: 18  Height: 162.6 cm (5' 4\")  Weight: 74.8 kg (165 lb)  SpO2: 99 %  Physical Exam  Vitals reviewed.   Constitutional:       General: She is not in acute distress.     Appearance: She is well-developed.   HENT:      Head: Normocephalic and atraumatic.      Right Ear: External ear normal.      Left Ear: " External ear normal.      Nose: Nose normal.      Mouth/Throat:      Mouth: Mucous membranes are moist.      Pharynx: Oropharynx is clear.   Eyes:      Extraocular Movements: Extraocular movements intact.      Conjunctiva/sclera: Conjunctivae normal.      Pupils: Pupils are equal, round, and reactive to light.   Cardiovascular:      Rate and Rhythm: Normal rate and regular rhythm.   Pulmonary:      Effort: Pulmonary effort is normal.   Abdominal:      Comments: Moderate pain with deep palpation in the left lower quadrant.  No abdominal distention.  No obvious mass with palpation.   Musculoskeletal:         General: Normal range of motion.      Cervical back: Normal range of motion.   Skin:     General: Skin is warm and dry.   Neurological:      Mental Status: She is alert and oriented to person, place, and time.   Psychiatric:         Behavior: Behavior normal.         ED COURSE and [facility]   1854.  Patient has symptoms and signs as described above.  Patient has symptoms that are consistent with her prior diagnoses of diverticulitis.  She is requesting to not obtain a CT scan.  I think this is reasonable.  She has a normal white count.  I am giving her the option to use Augmentin if not improved over the next 48 hours, prescription provided.  Tylenol for pain.  Patient agrees with plan.  She is convinced this is diverticulitis and I think that is a reasonable concern.    Electronic medical chart reviewed, including medical problems, medications, medical allergies, social history.  Recent hospitalizations and surgical procedures reviewed.  Recent clinic visits and consultations reviewed.  Recent labs and test results reviewed.  Nursing notes reviewed.    The patient, their parent if applicable, and/or their medical decision maker(s) and I have reviewed all of the available historical information, applicable PMH, physical exam findings, and objective diagnostic data gathered during this ED visit.  We then discussed all  work-up options and then together agreed upon the course taken during this visit.  The ultimate disposition and plan was a cooperative decision made between myself and the patient, their parent if applicable, and/or their legal decision maker(s).  The risks and benefits of all decisions made during this visit were discussed to the best of my abilities given the circumstances, and all parties are understanding of the pertinent ramifications of these decisions.      LABS  Labs Ordered and Resulted from Time of ED Arrival to Time of ED Departure   BASIC METABOLIC PANEL - Abnormal       Result Value    Sodium 140      Potassium 4.7      Chloride 107      Carbon Dioxide (CO2) 23      Anion Gap 10      Urea Nitrogen 45.0 (*)     Creatinine 2.43 (*)     Calcium 12.9 (*)     Glucose 109 (*)     GFR Estimate 21 (*)    CBC WITH PLATELETS AND DIFFERENTIAL    WBC Count 7.9      RBC Count 3.85      Hemoglobin 12.1      Hematocrit 37.0      MCV 96      MCH 31.4      MCHC 32.7      RDW 12.7      Platelet Count 190      % Neutrophils 65      % Lymphocytes 22      % Monocytes 11      % Eosinophils 2      % Basophils 0      % Immature Granulocytes 0      NRBCs per 100 WBC 0      Absolute Neutrophils 5.2      Absolute Lymphocytes 1.7      Absolute Monocytes 0.8      Absolute Eosinophils 0.2      Absolute Basophils 0.0      Absolute Immature Granulocytes 0.0      Absolute NRBCs 0.0         IMAGING  Images reviewed by me.  Radiology report also reviewed.  No orders to display       Procedures    Medications - No data to display      IMPRESSION       ICD-10-CM    1. LLQ abdominal pain  R10.32                Medication List        Started      amoxicillin-clavulanate 875-125 MG tablet  Commonly known as: AUGMENTIN  1 tablet, Oral, 2 TIMES DAILY                          Aamir Hernandez MD  08/29/23 6675

## 2024-06-03 ENCOUNTER — HOSPITAL ENCOUNTER (EMERGENCY)
Facility: CLINIC | Age: 74
Discharge: HOME | End: 2024-06-03
Attending: FAMILY MEDICINE | Admitting: FAMILY MEDICINE
Payer: COMMERCIAL

## 2024-06-03 ENCOUNTER — APPOINTMENT (OUTPATIENT)
Dept: CT IMAGING | Facility: CLINIC | Age: 74
End: 2024-06-03
Attending: FAMILY MEDICINE
Payer: COMMERCIAL

## 2024-06-03 VITALS
OXYGEN SATURATION: 97 % | BODY MASS INDEX: 25.16 KG/M2 | WEIGHT: 151 LBS | HEIGHT: 65 IN | RESPIRATION RATE: 20 BRPM | HEART RATE: 77 BPM | DIASTOLIC BLOOD PRESSURE: 107 MMHG | TEMPERATURE: 98 F | SYSTOLIC BLOOD PRESSURE: 169 MMHG

## 2024-06-03 DIAGNOSIS — K57.92 DIVERTICULITIS: ICD-10-CM

## 2024-06-03 LAB
ALBUMIN SERPL BCG-MCNC: 4.3 G/DL (ref 3.5–5.2)
ALP SERPL-CCNC: 90 U/L (ref 40–150)
ALT SERPL W P-5'-P-CCNC: 15 U/L (ref 0–50)
ANION GAP SERPL CALCULATED.3IONS-SCNC: 14 MMOL/L (ref 7–15)
AST SERPL W P-5'-P-CCNC: 23 U/L (ref 0–45)
BASOPHILS # BLD AUTO: 0 10E3/UL (ref 0–0.2)
BASOPHILS NFR BLD AUTO: 1 %
BILIRUB SERPL-MCNC: 0.3 MG/DL
BUN SERPL-MCNC: 39.5 MG/DL (ref 8–23)
CALCIUM SERPL-MCNC: 10.1 MG/DL (ref 8.8–10.2)
CHLORIDE SERPL-SCNC: 106 MMOL/L (ref 98–107)
CREAT SERPL-MCNC: 2.63 MG/DL (ref 0.51–0.95)
DEPRECATED HCO3 PLAS-SCNC: 21 MMOL/L (ref 22–29)
EGFRCR SERPLBLD CKD-EPI 2021: 19 ML/MIN/1.73M2
EOSINOPHIL # BLD AUTO: 0.2 10E3/UL (ref 0–0.7)
EOSINOPHIL NFR BLD AUTO: 3 %
ERYTHROCYTE [DISTWIDTH] IN BLOOD BY AUTOMATED COUNT: 12.9 % (ref 10–15)
GLUCOSE SERPL-MCNC: 96 MG/DL (ref 70–99)
HCT VFR BLD AUTO: 38.4 % (ref 35–47)
HGB BLD-MCNC: 12.6 G/DL (ref 11.7–15.7)
IMM GRANULOCYTES # BLD: 0 10E3/UL
IMM GRANULOCYTES NFR BLD: 0 %
LIPASE SERPL-CCNC: 84 U/L (ref 13–60)
LYMPHOCYTES # BLD AUTO: 2 10E3/UL (ref 0.8–5.3)
LYMPHOCYTES NFR BLD AUTO: 23 %
MCH RBC QN AUTO: 31.4 PG (ref 26.5–33)
MCHC RBC AUTO-ENTMCNC: 32.8 G/DL (ref 31.5–36.5)
MCV RBC AUTO: 96 FL (ref 78–100)
MONOCYTES # BLD AUTO: 0.8 10E3/UL (ref 0–1.3)
MONOCYTES NFR BLD AUTO: 9 %
NEUTROPHILS # BLD AUTO: 5.8 10E3/UL (ref 1.6–8.3)
NEUTROPHILS NFR BLD AUTO: 65 %
NRBC # BLD AUTO: 0 10E3/UL
NRBC BLD AUTO-RTO: 0 /100
PLATELET # BLD AUTO: 193 10E3/UL (ref 150–450)
POTASSIUM SERPL-SCNC: 4.7 MMOL/L (ref 3.4–5.3)
PROT SERPL-MCNC: 7.4 G/DL (ref 6.4–8.3)
RBC # BLD AUTO: 4.01 10E6/UL (ref 3.8–5.2)
SODIUM SERPL-SCNC: 141 MMOL/L (ref 135–145)
WBC # BLD AUTO: 8.8 10E3/UL (ref 4–11)

## 2024-06-03 PROCEDURE — 96375 TX/PRO/DX INJ NEW DRUG ADDON: CPT | Performed by: FAMILY MEDICINE

## 2024-06-03 PROCEDURE — 96376 TX/PRO/DX INJ SAME DRUG ADON: CPT | Performed by: FAMILY MEDICINE

## 2024-06-03 PROCEDURE — 250N000013 HC RX MED GY IP 250 OP 250 PS 637: Performed by: FAMILY MEDICINE

## 2024-06-03 PROCEDURE — 99284 EMERGENCY DEPT VISIT MOD MDM: CPT | Performed by: FAMILY MEDICINE

## 2024-06-03 PROCEDURE — 74176 CT ABD & PELVIS W/O CONTRAST: CPT

## 2024-06-03 PROCEDURE — 99285 EMERGENCY DEPT VISIT HI MDM: CPT | Mod: 25 | Performed by: FAMILY MEDICINE

## 2024-06-03 PROCEDURE — 96374 THER/PROPH/DIAG INJ IV PUSH: CPT | Performed by: FAMILY MEDICINE

## 2024-06-03 PROCEDURE — 85025 COMPLETE CBC W/AUTO DIFF WBC: CPT | Performed by: FAMILY MEDICINE

## 2024-06-03 PROCEDURE — 83690 ASSAY OF LIPASE: CPT | Performed by: FAMILY MEDICINE

## 2024-06-03 PROCEDURE — 250N000011 HC RX IP 250 OP 636: Performed by: FAMILY MEDICINE

## 2024-06-03 PROCEDURE — 36415 COLL VENOUS BLD VENIPUNCTURE: CPT | Performed by: FAMILY MEDICINE

## 2024-06-03 PROCEDURE — 82040 ASSAY OF SERUM ALBUMIN: CPT | Performed by: FAMILY MEDICINE

## 2024-06-03 RX ORDER — IOPAMIDOL 755 MG/ML
74 INJECTION, SOLUTION INTRAVASCULAR ONCE
Status: DISCONTINUED | OUTPATIENT
Start: 2024-06-03 | End: 2024-06-04 | Stop reason: HOSPADM

## 2024-06-03 RX ORDER — AMOXICILLIN AND CLAVULANATE POTASSIUM 500; 125 MG/1; MG/1
1 TABLET, FILM COATED ORAL ONCE
Status: COMPLETED | OUTPATIENT
Start: 2024-06-03 | End: 2024-06-03

## 2024-06-03 RX ORDER — AMOXICILLIN AND CLAVULANATE POTASSIUM 400; 57 MG/5ML; MG/5ML
400 POWDER, FOR SUSPENSION ORAL 2 TIMES DAILY
Qty: 70 ML | Refills: 0 | Status: SHIPPED | OUTPATIENT
Start: 2024-06-03 | End: 2024-06-10

## 2024-06-03 RX ORDER — HYDROMORPHONE HYDROCHLORIDE 1 MG/ML
0.5 INJECTION, SOLUTION INTRAMUSCULAR; INTRAVENOUS; SUBCUTANEOUS EVERY 30 MIN PRN
Status: DISCONTINUED | OUTPATIENT
Start: 2024-06-03 | End: 2024-06-04 | Stop reason: HOSPADM

## 2024-06-03 RX ORDER — ONDANSETRON 2 MG/ML
4 INJECTION INTRAMUSCULAR; INTRAVENOUS EVERY 30 MIN PRN
Status: DISCONTINUED | OUTPATIENT
Start: 2024-06-03 | End: 2024-06-04 | Stop reason: HOSPADM

## 2024-06-03 RX ADMIN — AMOXICILLIN AND CLAVULANATE POTASSIUM 1 TABLET: 500; 125 TABLET, FILM COATED ORAL at 23:14

## 2024-06-03 RX ADMIN — HYDROMORPHONE HYDROCHLORIDE 0.5 MG: 1 INJECTION, SOLUTION INTRAMUSCULAR; INTRAVENOUS; SUBCUTANEOUS at 20:45

## 2024-06-03 RX ADMIN — ONDANSETRON 4 MG: 2 INJECTION INTRAMUSCULAR; INTRAVENOUS at 20:42

## 2024-06-03 RX ADMIN — ONDANSETRON 4 MG: 2 INJECTION INTRAMUSCULAR; INTRAVENOUS at 21:27

## 2024-06-03 ASSESSMENT — COLUMBIA-SUICIDE SEVERITY RATING SCALE - C-SSRS
1. IN THE PAST MONTH, HAVE YOU WISHED YOU WERE DEAD OR WISHED YOU COULD GO TO SLEEP AND NOT WAKE UP?: NO
2. HAVE YOU ACTUALLY HAD ANY THOUGHTS OF KILLING YOURSELF IN THE PAST MONTH?: NO
6. HAVE YOU EVER DONE ANYTHING, STARTED TO DO ANYTHING, OR PREPARED TO DO ANYTHING TO END YOUR LIFE?: NO

## 2024-06-03 ASSESSMENT — ACTIVITIES OF DAILY LIVING (ADL)
ADLS_ACUITY_SCORE: 36

## 2024-06-04 NOTE — ED TRIAGE NOTES
"Present with lower midline abdominal pain since Saturday, patient reports history of diverticulitis and reports this pain feeling very similar.  Patient has constipation with last normal BM on Friday. Has both appendix and gallbladder. Pain \"7\"/10     Triage Assessment (Adult)       Row Name 06/03/24 1950          Triage Assessment    Airway WDL WDL        Respiratory WDL    Respiratory WDL WDL        Skin Circulation/Temperature WDL    Skin Circulation/Temperature WDL WDL        Cardiac WDL    Cardiac WDL WDL        Peripheral/Neurovascular WDL    Peripheral Neurovascular WDL WDL        Cognitive/Neuro/Behavioral WDL    Cognitive/Neuro/Behavioral WDL WDL                     "

## 2024-06-04 NOTE — ED PROVIDER NOTES
History     Chief Complaint   Patient presents with    Abdominal Pain     History of diverticulitis - feels similar     HPI    Falguni Bennett is a 73 year old female who comes in with her  from home with 2 days of left lower quadrant abdominal pain.  She says it feels like diverticulitis that she has had in the past.  Initially she just wanted to have antibiotics and go home but her  is convinced her to have a workup.  She has been anorexic but has not been vomiting.  She has been having bowel movements.  She is not having irritative voiding symptoms.  She has had diverticulitis in the past and the first time that was diagnosed she was found to have a kidney mass that proved to be a renal cell carcinoma for which she has had a nephrectomy about a year ago.    Allergies:  Allergies   Allergen Reactions    Morphine [Morphine Sulfate-Nacl] Nausea and Vomiting    Cipro [Ciprofloxacin Hydrochloride]      Muscle weakness    Nuts      (Filberts) Throat swells       Problem List:    Patient Active Problem List    Diagnosis Date Noted    Malignant neoplasm of kidney excluding renal pelvis, right (H) 04/21/2023     Priority: Medium    Post-operative state 03/13/2023     Priority: Medium        Past Medical History:    Past Medical History:   Diagnosis Date    Anxiety     Chronic kidney disease     Dyslipidemia     HTN (hypertension)     Hyperparathyroidism (H)     Nephrolithiasis     Renal mass        Past Surgical History:    Past Surgical History:   Procedure Laterality Date    ANKLE SURGERY Right 2019    schwannoma removed from ankle    DAVINCI NEPHRECTOMY PARTIAL Right 3/13/2023    Procedure: NEPHRECTOMY, PARTIAL, ROBOT-ASSISTED, LAPAROSCOPIC - RIGHT, Ultrasound;  Surgeon: Urbano Mijares MD;  Location: [facility] OR    EXPLORE NECK  04/12/2011    Procedure:EXPLORE NECK; Surgeon:LUIS ADAM; Location:[facility] OR    EXTRACORPOREAL SHOCK WAVE LITHOTRIPSY (ESWL)      PARATHYROIDECTOMY  04/12/2011     "Procedure:PARATHYROIDECTOMY; Left inferior Parathyroidectomy; Surgeon:LUIS ADAM; Location:[facility] OR    TUBAL LIGATION         Family History:    Family History   Problem Relation Age of Onset    Ovarian Cancer Mother     Hypertension Mother     Lung Cancer Brother     Arthritis Maternal Grandmother     Cerebrovascular Disease Maternal Grandmother     Diabetes Paternal Grandmother     Cancer Maternal Aunt     Anesthesia Reaction No family hx of     Clotting Disorder No family hx of        Social History:  Marital Status:   [2]  Social History     Tobacco Use    Smoking status: Former     Current packs/day: 0.00     Types: Cigarettes     Quit date: 2011     Years since quittin.1   Substance Use Topics    Alcohol use: Yes     Comment: Rare    Drug use: Yes     Types: Marijuana     Comment: Once in a while        Medications:    amoxicillin-clavulanate (AUGMENTIN) 400-57 MG/5ML suspension  acetaminophen (TYLENOL) 325 MG tablet  atorvastatin (LIPITOR) 20 MG tablet  LORazepam (ATIVAN) 0.5 MG tablet  metoprolol succinate ER (TOPROL XL) 50 MG 24 hr tablet  senna-docusate (SENOKOT-S/PERICOLACE) 8.6-50 MG tablet      Review of Systems    All other systems are reviewed and are negative    Physical Exam   BP: (!) 169/107  Pulse: 77  Temp: 98  F (36.7  C)  Resp: 20  Height: 165.1 cm (5' 5\")  Weight: 68.5 kg (151 lb)  SpO2: 97 %      Physical Exam    Nursing note and vitals were reviewed.  Constitutional: Awake and alert, adequately nourished and developed appearing 73-year-old in no apparent discomfort, who does not appear acutely ill, and who answers questions appropriately and cooperates with examination.  HEENT: Speech is fluent.  Voice quality is normal.  EOMI.   Neck: Freely mobile.  Cardiovascular: Cardiac examination reveals normal heart rate and regular rhythm without murmur.  Pulmonary/Chest: Breathing is unlabored.  Breath sounds are clear and equal bilaterally.  There no retractions, tachypnea, " rales, wheezes, or rhonchi.  Abdomen: Soft, tender in the left lower quadrant with localized guarding but no generalized peritonitis., no HSM or masses rebound or guarding.  Musculoskeletal: Extremities are warm and well-perfused and without edema  Neurological: Alert, oriented, thought content logical, coherent   Skin: Warm, dry, no rashes.  Psychiatric: Affect broad and appropriate.    ED Course        Procedures              Critical Care time:  none               Results for orders placed or performed during the hospital encounter of 06/03/24 (from the past 24 hour(s))   CBC with platelets differential    Narrative    The following orders were created for panel order CBC with platelets differential.  Procedure                               Abnormality         Status                     ---------                               -----------         ------                     CBC with platelets and d...[[id number]]                      Final result                 Please view results for these tests on the individual orders.   Comprehensive metabolic panel   Result Value Ref Range    Sodium 141 135 - 145 mmol/L    Potassium 4.7 3.4 - 5.3 mmol/L    Carbon Dioxide (CO2) 21 (L) 22 - 29 mmol/L    Anion Gap 14 7 - 15 mmol/L    Urea Nitrogen 39.5 (H) 8.0 - 23.0 mg/dL    Creatinine 2.63 (H) 0.51 - 0.95 mg/dL    GFR Estimate 19 (L) >60 mL/min/1.73m2    Calcium 10.1 8.8 - 10.2 mg/dL    Chloride 106 98 - 107 mmol/L    Glucose 96 70 - 99 mg/dL    Alkaline Phosphatase 90 40 - 150 U/L    AST 23 0 - 45 U/L    ALT 15 0 - 50 U/L    Protein Total 7.4 6.4 - 8.3 g/dL    Albumin 4.3 3.5 - 5.2 g/dL    Bilirubin Total 0.3 <=1.2 mg/dL   Lipase   Result Value Ref Range    Lipase 84 (H) 13 - 60 U/L   CBC with platelets and differential   Result Value Ref Range    WBC Count 8.8 4.0 - 11.0 10e3/uL    RBC Count 4.01 3.80 - 5.20 10e6/uL    Hemoglobin 12.6 11.7 - 15.7 g/dL    Hematocrit 38.4 35.0 - 47.0 %    MCV 96 78 - 100 fL    MCH 31.4 26.5 -  33.0 pg    MCHC 32.8 31.5 - 36.5 g/dL    RDW 12.9 10.0 - 15.0 %    Platelet Count 193 150 - 450 10e3/uL    % Neutrophils 65 %    % Lymphocytes 23 %    % Monocytes 9 %    % Eosinophils 3 %    % Basophils 1 %    % Immature Granulocytes 0 %    NRBCs per 100 WBC 0 <1 /100    Absolute Neutrophils 5.8 1.6 - 8.3 10e3/uL    Absolute Lymphocytes 2.0 0.8 - 5.3 10e3/uL    Absolute Monocytes 0.8 0.0 - 1.3 10e3/uL    Absolute Eosinophils 0.2 0.0 - 0.7 10e3/uL    Absolute Basophils 0.0 0.0 - 0.2 10e3/uL    Absolute Immature Granulocytes 0.0 <=0.4 10e3/uL    Absolute NRBCs 0.0 10e3/uL   CT Abdomen Pelvis w/o Contrast    Narrative    EXAM: CT ABDOMEN PELVIS W/O CONTRAST  LOCATION: [facility]  DATE: 6/3/2024    INDICATION: LLQ abd pain  COMPARISON: CT biopsy procedure 2/15/2023. CT abdomen pelvis 12/30/2022  TECHNIQUE: CT scan of the abdomen and pelvis was performed without IV contrast. Multiplanar reformats were obtained. Dose reduction techniques were used.  CONTRAST: None.    FINDINGS:   LOWER CHEST: Normal.    HEPATOBILIARY: Normal.    PANCREAS: Fatty infiltration of pancreatic head unchanged, no acute inflammatory process.    SPLEEN: Normal.    ADRENAL GLANDS: Normal.    KIDNEYS/BLADDER: Interval nephron sparing resection of right lower pole mass. No suggestion for residual tumor on this noncontrast exam. Small additional low dense and hyperdense cysts otherwise unchanged in need no dedicated follow-up. Numerous   bilateral kidney stones and parenchymal calcifications are unchanged, there is no hydronephrosis.    No bladder stones. Numerous pelvic phleboliths are present including multiple of midline in close proximity to the nondilated ureters, there is no convincing distal ureteral stone. No change.    BOWEL: Diverticulosis of the colon. Resolution of surrounding inflammatory change seen on prior study. No obstruction. However, there is mild localized soft tissue stranding at the level of the  rectum with questionable mild inferior rectal wall soft   tissue thickening.    LYMPH NODES: Normal.    VASCULATURE: Unremarkable.    PELVIC ORGANS: No pelvic mass or free fluid.    MUSCULOSKELETAL: Degenerative changes throughout lumbar spine. Mild scoliosis.      Impression    IMPRESSION:   1.  There is mild localized soft tissue stranding surrounding low rectum and anus region questionable wall thickening. No definitive inflammatory bowel wall abnormality on this noncontrast study.  2.  Resolution of the previous site of acute diverticulitis. Moderate sigmoid diverticulosis persists.  3.  Postoperative changes right kidney.  4.  Numerous bilateral kidney stones, no definite ureteral stone or hydronephrosis.         Medications   HYDROmorphone (PF) (DILAUDID) injection 0.5 mg (0.5 mg Intravenous $Given 6/3/24 2045)   ondansetron (ZOFRAN) injection 4 mg (4 mg Intravenous $Given 6/3/24 2127)   iopamidol (ISOVUE-370) solution 74 mL ( Intravenous Canceled Entry 6/3/24 2303)   sodium chloride 0.9 % bag 500mL for CT scan flush use ( As instructed Canceled Entry 6/3/24 2303)   amoxicillin-clavulanate (AUGMENTIN) 500-125 MG per tablet 1 tablet (has no administration in time range)       Assessments & Plan (with Medical Decision Making)     73-year-old female presented with 2 days of left lower quadrant abdominal pain reminiscent of prior episodes of diverticulitis.  She was tender in the left lower quadrant with localized rebound and guarding but no generalized peritonitis.  Her vital signs were normal except for mild elevation of her blood pressure.  CT scan of the abdomen pelvis showed localized soft tissue stranding around the lower rectum and anus and questionable wall thickening with sigmoid diverticulosis present.  The scan had to be done without contrast due to her impaired renal function with GFR below 30.  I suspect this is early mild diverticulitis.  Her CBC is normal.  Her blood chemistries are normal except  for her renal function.  Her lipase has a tiny elevation but not meeting criteria for pancreatitis and I have no concern for this in the context of her symptoms.  There is no evidence of an abscess or free perforation.  We will treated as diverticulitis with Augmentin.  I have prescribed renally adjusted doses of 400 mg twice daily to take for 7 days.  If she is not improved in 2 days or has new or worsening symptoms at any time she should be seen for reevaluation.  If she has not had a colonoscopy in the last 10 years she should have 1 of these after complete resolution of her symptoms.  She expressed understanding of the recommendations and her questions were answered.    I have reviewed the nursing notes.    I have reviewed the findings, diagnosis, plan and need for follow up with the patient.         New Prescriptions    AMOXICILLIN-CLAVULANATE (AUGMENTIN) 400-57 MG/5ML SUSPENSION    Take 5 mLs (400 mg) by mouth 2 times daily for 7 days       Final diagnoses:   Diverticulitis       6/3/2024   [facility] EMERGENCY DEPT       Mika Martinez MD  06/03/24 [id number]

## 2024-06-04 NOTE — DISCHARGE INSTRUCTIONS
Take Augmentin 400 mg (5 ml) twice daily for 7 days.  Be seen if you are not improved in 2 days.  Be seen if you are having worsening symptoms including increased pain, development of fevers, vomiting, or other worrisome symptoms.  If you have not had a colonoscopy in the last 10 years, you should have 1 of these after the symptoms of all resolved.  For your chronic constipation try taking a probiotic such as Culturelle.

## 2024-07-27 ENCOUNTER — HEALTH MAINTENANCE LETTER (OUTPATIENT)
Age: 74
End: 2024-07-27

## 2024-12-14 ENCOUNTER — HEALTH MAINTENANCE LETTER (OUTPATIENT)
Age: 74
End: 2024-12-14

## 2025-08-10 ENCOUNTER — HEALTH MAINTENANCE LETTER (OUTPATIENT)
Age: 75
End: 2025-08-10

## (undated) DEVICE — WIPES FOLEY CARE SURESTEP PROVON DFC100

## (undated) DEVICE — ADH SKIN CLOSURE PREMIERPRO EXOFIN 1.0ML 3470

## (undated) DEVICE — SUCTION IRR STRYKERFLOW II W/TIP 250-070-520

## (undated) DEVICE — DRAPE SHEET REV FOLD 3/4 9349

## (undated) DEVICE — DRAPE MAYO STAND 23X54 8337

## (undated) DEVICE — Device

## (undated) DEVICE — SU VICRYL 0 CT-1 27" UND J260H

## (undated) DEVICE — DRAPE U SPLIT 74X120" 29440

## (undated) DEVICE — DAVINCI XI MONOPOLAR SCISSORS HOT SHEARS 8MM 470179

## (undated) DEVICE — LINEN TOWEL PACK X6 WHITE 5487

## (undated) DEVICE — LINEN TOWEL PACK X30 5481

## (undated) DEVICE — DAVINCI HOT SHEARS TIP COVER  400180

## (undated) DEVICE — SURGICEL HEMOSTAT 4X8" 1952

## (undated) DEVICE — SUCTION MANIFOLD NEPTUNE 2 SYS 4 PORT 0702-020-000

## (undated) DEVICE — DRAPE IOBAN INCISE 23X17" 6650EZ

## (undated) DEVICE — ENDO POUCH UNIV RETRIEVAL SYSTEM INZII 10MM CD001

## (undated) DEVICE — TUBING FILTER TRI-LUMEN AIRSEAL ASC-EVAC1

## (undated) DEVICE — GLOVE BIOGEL PI MICRO SZ 8.5 48585

## (undated) DEVICE — SU STRATAFIX PDS PLUS 3-0 SPIRAL SH 15CM SXPP1B420

## (undated) DEVICE — SU VICRYL 0 UR-6 27" J603H

## (undated) DEVICE — STRAP UNIVERSAL POSITIONING 2-PIECE 4X47X76" 91-287

## (undated) DEVICE — HEM-O-LOK

## (undated) DEVICE — DAVINCI XI ESU BIPOLAR 3MM ENDOWRIST FENESTRATED EXT 471205

## (undated) DEVICE — PACK DAVINCI UROL

## (undated) DEVICE — DAVINCI XI SEAL UNIVERSAL 5-8MM 470361

## (undated) DEVICE — RX SURGIFLO HEMOSTATIC MATRIX W/THROMBIN 8ML 2994

## (undated) DEVICE — CLIP ENDO HEMO-LOC PURPLE LG 544240

## (undated) DEVICE — SU MONOCRYL 4-0 PS-2 27" UND Y426H

## (undated) DEVICE — SOL WATER IRRIG 1000ML BOTTLE 2F7114

## (undated) DEVICE — DAVINCI XI DRAPE COLUMN 470341

## (undated) DEVICE — GLOVE BIOGEL PI MICRO SZ 8.0 48580

## (undated) DEVICE — DAVINCI XI DRAPE ARM 470015

## (undated) DEVICE — PREP CHLORAPREP 26ML TINTED HI-LITE ORANGE 930815

## (undated) DEVICE — SYRINGE MNJCT 12ML LF STRL LL TIP MED PP DISP

## (undated) DEVICE — ENDO TROCAR CONMED AIRSEAL BLADELESS 12X120MM IAS12-120LP

## (undated) DEVICE — NDL INSUFFLATION 13GA 120MM C2201

## (undated) DEVICE — ENDO TROCAR FIRST ENTRY KII FIOS Z-THRD 05X100MM CTF03

## (undated) DEVICE — SU VICRYL 2-0 SH 27" UND J417H

## (undated) DEVICE — NEEDLE HYPO 2" X 21GA 305129

## (undated) DEVICE — SYSTEM LAPAROVUE VISIBILITY LAPVUE10

## (undated) DEVICE — TAPE MEDIPORE 4"X2YD 2864

## (undated) DEVICE — PACK GOWN 3/PK DISP XL SBA32GPFCB

## (undated) DEVICE — PROTECTOR ARM ONE-STEP TRENDELENBURG 40418

## (undated) RX ORDER — ONDANSETRON 2 MG/ML
INJECTION INTRAMUSCULAR; INTRAVENOUS
Status: DISPENSED
Start: 2023-03-13

## (undated) RX ORDER — OXYCODONE HYDROCHLORIDE 5 MG/1
TABLET ORAL
Status: DISPENSED
Start: 2023-03-13

## (undated) RX ORDER — FENTANYL CITRATE 50 UG/ML
INJECTION, SOLUTION INTRAMUSCULAR; INTRAVENOUS
Status: DISPENSED
Start: 2023-02-15

## (undated) RX ORDER — FENTANYL CITRATE 50 UG/ML
INJECTION, SOLUTION INTRAMUSCULAR; INTRAVENOUS
Status: DISPENSED
Start: 2023-03-13

## (undated) RX ORDER — DEXAMETHASONE SODIUM PHOSPHATE 4 MG/ML
INJECTION, SOLUTION INTRA-ARTICULAR; INTRALESIONAL; INTRAMUSCULAR; INTRAVENOUS; SOFT TISSUE
Status: DISPENSED
Start: 2023-03-13

## (undated) RX ORDER — LIDOCAINE HYDROCHLORIDE 10 MG/ML
INJECTION, SOLUTION EPIDURAL; INFILTRATION; INTRACAUDAL; PERINEURAL
Status: DISPENSED
Start: 2023-02-15

## (undated) RX ORDER — SODIUM CHLORIDE 9 MG/ML
INJECTION, SOLUTION INTRAVENOUS
Status: DISPENSED
Start: 2023-02-15

## (undated) RX ORDER — HYDROMORPHONE HCL IN WATER/PF 6 MG/30 ML
PATIENT CONTROLLED ANALGESIA SYRINGE INTRAVENOUS
Status: DISPENSED
Start: 2023-03-13

## (undated) RX ORDER — GLYCOPYRROLATE 0.2 MG/ML
INJECTION, SOLUTION INTRAMUSCULAR; INTRAVENOUS
Status: DISPENSED
Start: 2023-03-13

## (undated) RX ORDER — PROPOFOL 10 MG/ML
INJECTION, EMULSION INTRAVENOUS
Status: DISPENSED
Start: 2023-03-13

## (undated) RX ORDER — HYDROMORPHONE HYDROCHLORIDE 1 MG/ML
INJECTION, SOLUTION INTRAMUSCULAR; INTRAVENOUS; SUBCUTANEOUS
Status: DISPENSED
Start: 2023-03-13

## (undated) RX ORDER — ALBUTEROL SULFATE 90 UG/1
AEROSOL, METERED RESPIRATORY (INHALATION)
Status: DISPENSED
Start: 2023-03-13

## (undated) RX ORDER — FENTANYL CITRATE-0.9 % NACL/PF 10 MCG/ML
PLASTIC BAG, INJECTION (ML) INTRAVENOUS
Status: DISPENSED
Start: 2023-03-13

## (undated) RX ORDER — BUPIVACAINE HYDROCHLORIDE 2.5 MG/ML
INJECTION, SOLUTION EPIDURAL; INFILTRATION; INTRACAUDAL
Status: DISPENSED
Start: 2023-03-13

## (undated) RX ORDER — CEFAZOLIN SODIUM/WATER 2 G/20 ML
SYRINGE (ML) INTRAVENOUS
Status: DISPENSED
Start: 2023-03-13

## (undated) RX ORDER — ACETAMINOPHEN 325 MG/1
TABLET ORAL
Status: DISPENSED
Start: 2023-03-13